# Patient Record
Sex: MALE | Race: WHITE | Employment: OTHER | URBAN - METROPOLITAN AREA
[De-identification: names, ages, dates, MRNs, and addresses within clinical notes are randomized per-mention and may not be internally consistent; named-entity substitution may affect disease eponyms.]

---

## 2024-06-27 ENCOUNTER — HOSPITAL ENCOUNTER (INPATIENT)
Age: 69
LOS: 9 days | Discharge: NURSING, REHAB OR LONG-TERM CARE | End: 2024-07-06
Attending: GENERAL PRACTICE
Payer: MEDICARE

## 2024-06-27 ENCOUNTER — APPOINTMENT (OUTPATIENT)
Dept: CT IMAGING | Age: 69
End: 2024-06-27
Payer: MEDICARE

## 2024-06-27 DIAGNOSIS — R33.8 ACUTE URINARY RETENTION: ICD-10-CM

## 2024-06-27 DIAGNOSIS — N17.9 ACUTE KIDNEY INJURY (HCC): Primary | ICD-10-CM

## 2024-06-27 DIAGNOSIS — K92.1 MELENA: ICD-10-CM

## 2024-06-27 DIAGNOSIS — R00.0 TACHYCARDIA: ICD-10-CM

## 2024-06-27 PROBLEM — R63.4 WEIGHT LOSS: Status: ACTIVE | Noted: 2024-06-27

## 2024-06-27 PROBLEM — D64.9 ANEMIA: Status: ACTIVE | Noted: 2024-06-27

## 2024-06-27 PROBLEM — R33.9 URINE RETENTION: Status: ACTIVE | Noted: 2024-06-27

## 2024-06-27 LAB
ALBUMIN SERPL-MCNC: 2.6 G/DL (ref 3.2–4.6)
ALBUMIN/GLOB SERPL: 0.6 (ref 1–1.9)
ALP SERPL-CCNC: 76 U/L (ref 40–129)
ALT SERPL-CCNC: 15 U/L (ref 12–65)
ANION GAP SERPL CALC-SCNC: 21 MMOL/L (ref 9–18)
APPEARANCE UR: CLEAR
AST SERPL-CCNC: 27 U/L (ref 15–37)
BACTERIA URNS QL MICRO: ABNORMAL /HPF
BASOPHILS # BLD: 0 K/UL (ref 0–0.2)
BASOPHILS NFR BLD: 0 % (ref 0–2)
BILIRUB SERPL-MCNC: 0.2 MG/DL (ref 0–1.2)
BILIRUB UR QL: NEGATIVE
BUN SERPL-MCNC: 157 MG/DL (ref 8–23)
CALCIUM SERPL-MCNC: 10 MG/DL (ref 8.8–10.2)
CASTS URNS QL MICRO: ABNORMAL /LPF
CHLORIDE SERPL-SCNC: 106 MMOL/L (ref 98–107)
CO2 SERPL-SCNC: 16 MMOL/L (ref 20–28)
COLOR UR: ABNORMAL
CREAT SERPL-MCNC: 6.52 MG/DL (ref 0.8–1.3)
DIFFERENTIAL METHOD BLD: ABNORMAL
EOSINOPHIL # BLD: 0 K/UL (ref 0–0.8)
EOSINOPHIL NFR BLD: 0 % (ref 0.5–7.8)
EPI CELLS #/AREA URNS HPF: ABNORMAL /HPF
ERYTHROCYTE [DISTWIDTH] IN BLOOD BY AUTOMATED COUNT: 13.8 % (ref 11.9–14.6)
FOLATE SERPL-MCNC: 28.6 NG/ML (ref 3.1–17.5)
GLOBULIN SER CALC-MCNC: 4.4 G/DL (ref 2.3–3.5)
GLUCOSE SERPL-MCNC: 95 MG/DL (ref 70–99)
GLUCOSE UR STRIP.AUTO-MCNC: NEGATIVE MG/DL
HCT VFR BLD AUTO: 30.6 % (ref 41.1–50.3)
HGB BLD-MCNC: 9.9 G/DL (ref 13.6–17.2)
HGB UR QL STRIP: NEGATIVE
IMM GRANULOCYTES # BLD AUTO: 0 K/UL (ref 0–0.5)
IMM GRANULOCYTES NFR BLD AUTO: 0 % (ref 0–5)
INR PPP: 1.2
IRON SATN MFR SERPL: 21 % (ref 20–50)
IRON SERPL-MCNC: 38 UG/DL (ref 35–100)
KETONES UR QL STRIP.AUTO: NEGATIVE MG/DL
LACTATE SERPL-SCNC: 0.9 MMOL/L (ref 0.5–2)
LEUKOCYTE ESTERASE UR QL STRIP.AUTO: ABNORMAL
LYMPHOCYTES # BLD: 0.4 K/UL (ref 0.5–4.6)
LYMPHOCYTES NFR BLD: 5 % (ref 13–44)
MCH RBC QN AUTO: 30.7 PG (ref 26.1–32.9)
MCHC RBC AUTO-ENTMCNC: 32.4 G/DL (ref 31.4–35)
MCV RBC AUTO: 95 FL (ref 82–102)
MONOCYTES # BLD: 0.4 K/UL (ref 0.1–1.3)
MONOCYTES NFR BLD: 5 % (ref 4–12)
NEUTS SEG # BLD: 8 K/UL (ref 1.7–8.2)
NEUTS SEG NFR BLD: 90 % (ref 43–78)
NITRITE UR QL STRIP.AUTO: NEGATIVE
NRBC # BLD: 0 K/UL (ref 0–0.2)
OTHER OBSERVATIONS: ABNORMAL
PH UR STRIP: 5.5 (ref 5–9)
PLATELET # BLD AUTO: 185 K/UL (ref 150–450)
PMV BLD AUTO: 10 FL (ref 9.4–12.3)
POTASSIUM SERPL-SCNC: 3.7 MMOL/L (ref 3.5–5.1)
PROT SERPL-MCNC: 7 G/DL (ref 6.3–8.2)
PROT UR STRIP-MCNC: NEGATIVE MG/DL
PROTHROMBIN TIME: 15.8 SEC (ref 11.3–14.9)
RBC # BLD AUTO: 3.22 M/UL (ref 4.23–5.6)
RBC #/AREA URNS HPF: ABNORMAL /HPF
SODIUM SERPL-SCNC: 143 MMOL/L (ref 136–145)
SP GR UR REFRACTOMETRY: 1.01 (ref 1–1.02)
TIBC SERPL-MCNC: 181 UG/DL (ref 240–450)
UIBC SERPL-MCNC: 143 UG/DL (ref 112–347)
UROBILINOGEN UR QL STRIP.AUTO: 0.2 EU/DL (ref 0.2–1)
VIT B12 SERPL-MCNC: >4000 PG/ML (ref 193–986)
WBC # BLD AUTO: 8.9 K/UL (ref 4.3–11.1)
WBC URNS QL MICRO: ABNORMAL /HPF

## 2024-06-27 PROCEDURE — 2580000003 HC RX 258: Performed by: INTERNAL MEDICINE

## 2024-06-27 PROCEDURE — 82607 VITAMIN B-12: CPT

## 2024-06-27 PROCEDURE — C9113 INJ PANTOPRAZOLE SODIUM, VIA: HCPCS | Performed by: INTERNAL MEDICINE

## 2024-06-27 PROCEDURE — 85025 COMPLETE CBC W/AUTO DIFF WBC: CPT

## 2024-06-27 PROCEDURE — 81001 URINALYSIS AUTO W/SCOPE: CPT

## 2024-06-27 PROCEDURE — 83550 IRON BINDING TEST: CPT

## 2024-06-27 PROCEDURE — 1100000000 HC RM PRIVATE

## 2024-06-27 PROCEDURE — 84153 ASSAY OF PSA TOTAL: CPT

## 2024-06-27 PROCEDURE — 83540 ASSAY OF IRON: CPT

## 2024-06-27 PROCEDURE — 51702 INSERT TEMP BLADDER CATH: CPT

## 2024-06-27 PROCEDURE — 2580000003 HC RX 258: Performed by: GENERAL PRACTICE

## 2024-06-27 PROCEDURE — 82746 ASSAY OF FOLIC ACID SERUM: CPT

## 2024-06-27 PROCEDURE — 99285 EMERGENCY DEPT VISIT HI MDM: CPT

## 2024-06-27 PROCEDURE — 6370000000 HC RX 637 (ALT 250 FOR IP): Performed by: INTERNAL MEDICINE

## 2024-06-27 PROCEDURE — 87040 BLOOD CULTURE FOR BACTERIA: CPT

## 2024-06-27 PROCEDURE — 83605 ASSAY OF LACTIC ACID: CPT

## 2024-06-27 PROCEDURE — 84154 ASSAY OF PSA FREE: CPT

## 2024-06-27 PROCEDURE — 6360000002 HC RX W HCPCS: Performed by: INTERNAL MEDICINE

## 2024-06-27 PROCEDURE — 80053 COMPREHEN METABOLIC PANEL: CPT

## 2024-06-27 PROCEDURE — 85610 PROTHROMBIN TIME: CPT

## 2024-06-27 RX ORDER — TAMSULOSIN HYDROCHLORIDE 0.4 MG/1
0.4 CAPSULE ORAL DAILY
Status: DISCONTINUED | OUTPATIENT
Start: 2024-06-28 | End: 2024-07-06 | Stop reason: HOSPADM

## 2024-06-27 RX ORDER — 0.9 % SODIUM CHLORIDE 0.9 %
1000 INTRAVENOUS SOLUTION INTRAVENOUS ONCE
Status: COMPLETED | OUTPATIENT
Start: 2024-06-27 | End: 2024-06-27

## 2024-06-27 RX ORDER — ONDANSETRON 2 MG/ML
4 INJECTION INTRAMUSCULAR; INTRAVENOUS EVERY 6 HOURS PRN
Status: DISCONTINUED | OUTPATIENT
Start: 2024-06-27 | End: 2024-07-06 | Stop reason: HOSPADM

## 2024-06-27 RX ORDER — SODIUM CHLORIDE 0.9 % (FLUSH) 0.9 %
5-40 SYRINGE (ML) INJECTION EVERY 12 HOURS SCHEDULED
Status: DISCONTINUED | OUTPATIENT
Start: 2024-06-27 | End: 2024-07-06 | Stop reason: HOSPADM

## 2024-06-27 RX ORDER — SODIUM CHLORIDE 9 MG/ML
INJECTION, SOLUTION INTRAVENOUS PRN
Status: DISCONTINUED | OUTPATIENT
Start: 2024-06-27 | End: 2024-07-06 | Stop reason: HOSPADM

## 2024-06-27 RX ORDER — SODIUM CHLORIDE 0.9 % (FLUSH) 0.9 %
5-40 SYRINGE (ML) INJECTION PRN
Status: DISCONTINUED | OUTPATIENT
Start: 2024-06-27 | End: 2024-07-06 | Stop reason: HOSPADM

## 2024-06-27 RX ORDER — HYDROCODONE BITARTRATE AND ACETAMINOPHEN 5; 325 MG/1; MG/1
1 TABLET ORAL EVERY 6 HOURS PRN
Status: DISCONTINUED | OUTPATIENT
Start: 2024-06-27 | End: 2024-06-28

## 2024-06-27 RX ORDER — POLYETHYLENE GLYCOL 3350 17 G/17G
17 POWDER, FOR SOLUTION ORAL DAILY PRN
Status: DISCONTINUED | OUTPATIENT
Start: 2024-06-27 | End: 2024-07-06 | Stop reason: HOSPADM

## 2024-06-27 RX ORDER — SODIUM CHLORIDE 9 MG/ML
INJECTION, SOLUTION INTRAVENOUS CONTINUOUS
Status: DISCONTINUED | OUTPATIENT
Start: 2024-06-27 | End: 2024-06-28

## 2024-06-27 RX ORDER — ONDANSETRON 4 MG/1
4 TABLET, ORALLY DISINTEGRATING ORAL EVERY 8 HOURS PRN
Status: DISCONTINUED | OUTPATIENT
Start: 2024-06-27 | End: 2024-07-06 | Stop reason: HOSPADM

## 2024-06-27 RX ORDER — ACETAMINOPHEN 325 MG/1
650 TABLET ORAL EVERY 6 HOURS PRN
Status: DISCONTINUED | OUTPATIENT
Start: 2024-06-27 | End: 2024-07-06 | Stop reason: HOSPADM

## 2024-06-27 RX ADMIN — SODIUM CHLORIDE 1000 ML: 9 INJECTION, SOLUTION INTRAVENOUS at 21:42

## 2024-06-27 RX ADMIN — PANTOPRAZOLE SODIUM 40 MG: 40 INJECTION, POWDER, FOR SOLUTION INTRAVENOUS at 23:48

## 2024-06-27 RX ADMIN — SODIUM CHLORIDE: 9 INJECTION, SOLUTION INTRAVENOUS at 23:49

## 2024-06-27 RX ADMIN — HYOSCYAMINE SULFATE 125 MCG: 0.12 TABLET ORAL; SUBLINGUAL at 23:56

## 2024-06-27 RX ADMIN — SODIUM CHLORIDE, PRESERVATIVE FREE 10 ML: 5 INJECTION INTRAVENOUS at 23:48

## 2024-06-27 ASSESSMENT — LIFESTYLE VARIABLES
HOW OFTEN DO YOU HAVE A DRINK CONTAINING ALCOHOL: NEVER
HOW MANY STANDARD DRINKS CONTAINING ALCOHOL DO YOU HAVE ON A TYPICAL DAY: PATIENT DOES NOT DRINK

## 2024-06-27 ASSESSMENT — PAIN SCALES - GENERAL
PAINLEVEL_OUTOF10: 0
PAINLEVEL_OUTOF10: 6

## 2024-06-27 ASSESSMENT — PAIN DESCRIPTION - LOCATION: LOCATION: RECTUM;ABDOMEN

## 2024-06-27 ASSESSMENT — PAIN DESCRIPTION - DESCRIPTORS: DESCRIPTORS: PRESSURE

## 2024-06-27 ASSESSMENT — PAIN - FUNCTIONAL ASSESSMENT: PAIN_FUNCTIONAL_ASSESSMENT: 0-10

## 2024-06-27 NOTE — ED TRIAGE NOTES
Pt arrives via EMS from home.  Pt lives alone and called ems due to rectal bleeding for 2 weeks and frequent falls last couple days.  Vitals stable.    Pt has hard distended abdomen and skin infection on legs.  Pt denies hx and states he has not been to a doctor in years.

## 2024-06-27 NOTE — ED PROVIDER NOTES
Emergency Department Provider Note       PCP: No primary care provider on file.   Age: 68 y.o.   Sex: male     DISPOSITION Admitted 06/27/2024 09:30:04 PM       ICD-10-CM    1. Acute kidney injury (HCC)  N17.9       2. Melena  K92.1       3. Acute urinary retention  R33.8           Medical Decision Making     Patient presenting with acute urinary retention.  4 L of urine drained with Colby catheter insertion.  Patient likely had postobstructive uropathy.  Will expect this to improve with IV fluids and now that the Colby is placed.  Patient also describing melena but not having any further episodes here.  I doubt acute upper GI bleed.  Nevertheless, this will need to be worked up further in the hospital.  There is nothing to suggest surgical abdomen such as bowel obstruction, perforated viscus, or any other emergent pathology.  Nothing to suggest sepsis at this time.  He will be admitted for further workup and management     1 or more acute illnesses that pose a threat to life or bodily function.   Drug therapy given requiring intensive monitoring for toxicity.  Discussion with external consultants.  Shared medical decision making was utilized in creating the patients health plan today.    I independently ordered and reviewed each unique test.           The patient was admitted and I have discussed patient management with the admitting provider.    Exclusion criteria - the patient is NOT to be included for SEP-1 Core Measure due to: Infection is not suspected       History     Patient presents with fall and weakness.  Patient has had rectal bleeding for 2 weeks.  Patient states the stool is dark.  He apparently has not seen a doctor in about 20 to 25 years.  He also admits to abdominal distention.  He is having some emesis.  Has had rapid weight loss.  He says he was so weak unable to stand and even had trouble eating and drinking.  Patient denies history of alcohol or drug use.  He has no other symptoms at this  for Visit: Reassess, Consult  Malnutrition Screening Tool: Malnutrition Screen  Have you recently lost weight without trying?: 14 to 23 pounds (2   points)  Have you been eating poorly because of a decreased appetite?: Yes   (1 point)  Malnutrition Screening Tool Score: 3  6/28- consult for poor po (hospitalist)    Nutrition Recommendations/Plan:   Meals and Snacks:  Diet: Continue current order  Nutrition Supplement Therapy:  Medical food supplement therapy:  Continue Ensure Enlive three   times per day (this provides 350 kcal and 20 grams protein per   bottle)     Malnutrition Assessment:  Malnutrition Assessment:  Malnutrition Status: Moderate malnutrition  Context: Chronic Illness  Findings of clinical characteristics of malnutrition:   Energy Intake:  No significant decrease in energy intake  Weight Loss:  Unable to assess     Body Fat Loss:  Mild body fat loss Buccal region, Triceps, Fat   Overlying Ribs   Muscle Mass Loss:  Mild muscle mass loss Hand (interosseous),   Clavicles (pectoralis & deltoids)  Fluid Accumulation:  Unable to assess     Strength:  Not Performed     Nutrition Assessment:  Nutrition History: Unable to assess as pt is off the floor.      Do You Have Any Cultural, Christianity, or Ethnic Food   Preferences?: No   Weight History: No wt hx per EMR. Current weight still with low   BMI.   Nutrition Background:       No PMH. Pt admitted with CRISTIN. Per H&P pt with wounds.   Nutrition Interval:  Patient in chair eating lunch. He states he tries to eat a bit   more every day. He reports he is drinking the ensures. Discussed   with RN, Carol who reports he is eating great but does   everything quite slowly.     Current Nutrition Therapies:  ADULT DIET; Easy to Chew  ADULT ORAL NUTRITION SUPPLEMENT; Breakfast, Lunch, Dinner;   Standard High Calorie/High Protein Oral Supplement    Current Intake:   Average Meal Intake: % Average Supplements Intake: 51-75%      Anthropometric

## 2024-06-28 ENCOUNTER — APPOINTMENT (OUTPATIENT)
Dept: ULTRASOUND IMAGING | Age: 69
End: 2024-06-28
Payer: MEDICARE

## 2024-06-28 ENCOUNTER — APPOINTMENT (OUTPATIENT)
Dept: CT IMAGING | Age: 69
End: 2024-06-28
Payer: MEDICARE

## 2024-06-28 PROBLEM — D64.9 NORMOCYTIC ANEMIA: Status: ACTIVE | Noted: 2024-06-28

## 2024-06-28 PROBLEM — R64 CACHEXIA (HCC): Status: ACTIVE | Noted: 2024-06-28

## 2024-06-28 PROBLEM — R31.9 HEMATURIA: Status: ACTIVE | Noted: 2024-06-28

## 2024-06-28 LAB
ALBUMIN SERPL-MCNC: 2.2 G/DL (ref 3.2–4.6)
ALBUMIN/GLOB SERPL: 0.5 (ref 1–1.9)
ALP SERPL-CCNC: 56 U/L (ref 40–129)
ALT SERPL-CCNC: 13 U/L (ref 12–65)
ANION GAP SERPL CALC-SCNC: 16 MMOL/L (ref 9–18)
AST SERPL-CCNC: 20 U/L (ref 15–37)
BASOPHILS # BLD: 0 K/UL (ref 0–0.2)
BASOPHILS NFR BLD: 1 % (ref 0–2)
BILIRUB SERPL-MCNC: <0.2 MG/DL (ref 0–1.2)
BUN SERPL-MCNC: 141 MG/DL (ref 8–23)
CALCIUM SERPL-MCNC: 8.9 MG/DL (ref 8.8–10.2)
CALCIUM SERPL-MCNC: 9.1 MG/DL (ref 8.8–10.2)
CHLORIDE SERPL-SCNC: 113 MMOL/L (ref 98–107)
CO2 SERPL-SCNC: 18 MMOL/L (ref 20–28)
CREAT SERPL-MCNC: 5.69 MG/DL (ref 0.8–1.3)
CREAT UR-MCNC: 51.3 MG/DL (ref 39–259)
DIFFERENTIAL METHOD BLD: ABNORMAL
EOSINOPHIL # BLD: 0.1 K/UL (ref 0–0.8)
EOSINOPHIL NFR BLD: 1 % (ref 0.5–7.8)
ERYTHROCYTE [DISTWIDTH] IN BLOOD BY AUTOMATED COUNT: 13.9 % (ref 11.9–14.6)
FERRITIN SERPL-MCNC: 528 NG/ML (ref 8–388)
GLOBULIN SER CALC-MCNC: 4.1 G/DL (ref 2.3–3.5)
GLUCOSE SERPL-MCNC: 87 MG/DL (ref 70–99)
HCT VFR BLD AUTO: 27.4 % (ref 41.1–50.3)
HCT VFR BLD AUTO: 29.1 % (ref 41.1–50.3)
HCT VFR BLD AUTO: 30.5 % (ref 41.1–50.3)
HGB BLD-MCNC: 9 G/DL (ref 13.6–17.2)
HGB BLD-MCNC: 9.5 G/DL (ref 13.6–17.2)
HGB BLD-MCNC: 9.5 G/DL (ref 13.6–17.2)
IMM GRANULOCYTES # BLD AUTO: 0 K/UL (ref 0–0.5)
IMM GRANULOCYTES NFR BLD AUTO: 0 % (ref 0–5)
LYMPHOCYTES # BLD: 0.5 K/UL (ref 0.5–4.6)
LYMPHOCYTES NFR BLD: 7 % (ref 13–44)
MAGNESIUM SERPL-MCNC: 2.1 MG/DL (ref 1.8–2.4)
MCH RBC QN AUTO: 30.8 PG (ref 26.1–32.9)
MCHC RBC AUTO-ENTMCNC: 32.6 G/DL (ref 31.4–35)
MCV RBC AUTO: 94.5 FL (ref 82–102)
MONOCYTES # BLD: 0.5 K/UL (ref 0.1–1.3)
MONOCYTES NFR BLD: 6 % (ref 4–12)
NEUTS SEG # BLD: 6.4 K/UL (ref 1.7–8.2)
NEUTS SEG NFR BLD: 85 % (ref 43–78)
NRBC # BLD: 0 K/UL (ref 0–0.2)
PHOSPHATE SERPL-MCNC: 5.9 MG/DL (ref 2.5–4.5)
PLATELET # BLD AUTO: 161 K/UL (ref 150–450)
PMV BLD AUTO: 9.6 FL (ref 9.4–12.3)
POTASSIUM SERPL-SCNC: 3.5 MMOL/L (ref 3.5–5.1)
PROT SERPL-MCNC: 6.3 G/DL (ref 6.3–8.2)
PSA FREE MFR SERPL: 23.8 %
PSA FREE SERPL-MCNC: 9.1 NG/ML
PSA SERPL-MCNC: 27.6 NG/ML (ref 0–4)
PSA SERPL-MCNC: 38.4 NG/ML (ref 0–4)
PTH-INTACT SERPL-MCNC: 15.7 PG/ML (ref 15–65)
RBC # BLD AUTO: 3.08 M/UL (ref 4.23–5.6)
SODIUM SERPL-SCNC: 148 MMOL/L (ref 136–145)
SODIUM UR-SCNC: 73 MMOL/L
WBC # BLD AUTO: 7.5 K/UL (ref 4.3–11.1)

## 2024-06-28 PROCEDURE — 97535 SELF CARE MNGMENT TRAINING: CPT

## 2024-06-28 PROCEDURE — 2580000003 HC RX 258: Performed by: FAMILY MEDICINE

## 2024-06-28 PROCEDURE — 97530 THERAPEUTIC ACTIVITIES: CPT

## 2024-06-28 PROCEDURE — 36415 COLL VENOUS BLD VENIPUNCTURE: CPT

## 2024-06-28 PROCEDURE — 71250 CT THORAX DX C-: CPT

## 2024-06-28 PROCEDURE — 84153 ASSAY OF PSA TOTAL: CPT

## 2024-06-28 PROCEDURE — 6360000004 HC RX CONTRAST MEDICATION: Performed by: INTERNAL MEDICINE

## 2024-06-28 PROCEDURE — C9113 INJ PANTOPRAZOLE SODIUM, VIA: HCPCS | Performed by: INTERNAL MEDICINE

## 2024-06-28 PROCEDURE — 6370000000 HC RX 637 (ALT 250 FOR IP): Performed by: FAMILY MEDICINE

## 2024-06-28 PROCEDURE — 85014 HEMATOCRIT: CPT

## 2024-06-28 PROCEDURE — 97162 PT EVAL MOD COMPLEX 30 MIN: CPT

## 2024-06-28 PROCEDURE — 83735 ASSAY OF MAGNESIUM: CPT

## 2024-06-28 PROCEDURE — 99222 1ST HOSP IP/OBS MODERATE 55: CPT | Performed by: NURSE PRACTITIONER

## 2024-06-28 PROCEDURE — 76770 US EXAM ABDO BACK WALL COMP: CPT

## 2024-06-28 PROCEDURE — 85018 HEMOGLOBIN: CPT

## 2024-06-28 PROCEDURE — 82570 ASSAY OF URINE CREATININE: CPT

## 2024-06-28 PROCEDURE — 80053 COMPREHEN METABOLIC PANEL: CPT

## 2024-06-28 PROCEDURE — 2580000003 HC RX 258: Performed by: INTERNAL MEDICINE

## 2024-06-28 PROCEDURE — 84100 ASSAY OF PHOSPHORUS: CPT

## 2024-06-28 PROCEDURE — 85025 COMPLETE CBC W/AUTO DIFF WBC: CPT

## 2024-06-28 PROCEDURE — 6370000000 HC RX 637 (ALT 250 FOR IP): Performed by: INTERNAL MEDICINE

## 2024-06-28 PROCEDURE — 84300 ASSAY OF URINE SODIUM: CPT

## 2024-06-28 PROCEDURE — 92610 EVALUATE SWALLOWING FUNCTION: CPT

## 2024-06-28 PROCEDURE — 97166 OT EVAL MOD COMPLEX 45 MIN: CPT

## 2024-06-28 PROCEDURE — 6360000002 HC RX W HCPCS: Performed by: INTERNAL MEDICINE

## 2024-06-28 PROCEDURE — 1100000000 HC RM PRIVATE

## 2024-06-28 PROCEDURE — 82728 ASSAY OF FERRITIN: CPT

## 2024-06-28 PROCEDURE — 83970 ASSAY OF PARATHORMONE: CPT

## 2024-06-28 RX ORDER — SODIUM CHLORIDE 450 MG/100ML
INJECTION, SOLUTION INTRAVENOUS CONTINUOUS
Status: DISCONTINUED | OUTPATIENT
Start: 2024-06-28 | End: 2024-07-01

## 2024-06-28 RX ADMIN — SODIUM CHLORIDE: 4.5 INJECTION, SOLUTION INTRAVENOUS at 21:09

## 2024-06-28 RX ADMIN — HYDROCODONE BITARTRATE AND ACETAMINOPHEN 15 ML: 7.5; 325 SOLUTION ORAL at 10:25

## 2024-06-28 RX ADMIN — PANTOPRAZOLE SODIUM 40 MG: 40 INJECTION, POWDER, FOR SOLUTION INTRAVENOUS at 11:28

## 2024-06-28 RX ADMIN — SODIUM CHLORIDE, PRESERVATIVE FREE 10 ML: 5 INJECTION INTRAVENOUS at 10:34

## 2024-06-28 RX ADMIN — SODIUM CHLORIDE: 4.5 INJECTION, SOLUTION INTRAVENOUS at 10:28

## 2024-06-28 RX ADMIN — PANTOPRAZOLE SODIUM 40 MG: 40 INJECTION, POWDER, FOR SOLUTION INTRAVENOUS at 21:10

## 2024-06-28 RX ADMIN — SODIUM CHLORIDE, PRESERVATIVE FREE 10 ML: 5 INJECTION INTRAVENOUS at 21:14

## 2024-06-28 RX ADMIN — DIATRIZOATE MEGLUMINE AND DIATRIZOATE SODIUM 15 ML: 660; 100 LIQUID ORAL; RECTAL at 11:28

## 2024-06-28 RX ADMIN — TAMSULOSIN HYDROCHLORIDE 0.4 MG: 0.4 CAPSULE ORAL at 11:28

## 2024-06-28 ASSESSMENT — PAIN DESCRIPTION - ORIENTATION
ORIENTATION: POSTERIOR
ORIENTATION: LOWER

## 2024-06-28 ASSESSMENT — PAIN DESCRIPTION - PAIN TYPE: TYPE: ACUTE PAIN

## 2024-06-28 ASSESSMENT — PAIN DESCRIPTION - LOCATION
LOCATION: BACK
LOCATION: BACK

## 2024-06-28 ASSESSMENT — PAIN SCALES - GENERAL
PAINLEVEL_OUTOF10: 6
PAINLEVEL_OUTOF10: 6
PAINLEVEL_OUTOF10: 3

## 2024-06-28 ASSESSMENT — PAIN DESCRIPTION - FREQUENCY: FREQUENCY: INTERMITTENT

## 2024-06-28 ASSESSMENT — PAIN DESCRIPTION - ONSET: ONSET: GRADUAL

## 2024-06-28 NOTE — PROGRESS NOTES
END OF SHIFT NOTE:    INTAKE/OUTPUT  06/27 0701 - 06/28 0700  In: -   Out: 5750 [Urine:5750]  Voiding: No  Catheter: Yes  Drain:   Urinary Catheter 06/27/24 Coude;2 Way (Active)   $ Urethral catheter insertion $ Not inserted for procedure 06/27/24 2011   Catheter Indications Urinary retention (acute or chronic), continuous bladder irrigation or bladder outlet obstruction 06/28/24 1025   Site Assessment Red 06/28/24 1025   Urine Color Bloody 06/28/24 1025   Urine Appearance Other (Comment) 06/28/24 0539   Collection Container Standard 06/28/24 1025   Securement Method Securing device (Describe) 06/28/24 1025   Catheter Care  Perineal wipes 06/27/24 2330   Catheter Best Practices  Drainage tube clipped to bed;Catheter secured to thigh;Tamper seal intact;Bag below bladder;Bag not on floor;Lack of dependent loop in tubing;Drainage bag less than half full 06/28/24 1025   Status Draining;Patent 06/28/24 1025   Output (mL) 300 mL 06/28/24 1738               Flatus: Patient does have flatus present.    Stool:  occurrences.    Characteristics:                Emesis:  occurrences.    Characteristics:        VITAL SIGNS  Patient Vitals for the past 12 hrs:   Temp Pulse Resp BP SpO2   06/28/24 1124 97.5 °F (36.4 °C) 90 17 (!) 107/59 98 %   06/28/24 0732 97.7 °F (36.5 °C) 85 17 110/69 99 %       Pain Assessment  Pain Level: 3 (06/28/24 1115)  Pain Location: Back  Patient's Stated Pain Goal: 3    Ambulating  Yes    Shift report given to oncoming nurse at the bedside.    Gaby Rankin RN

## 2024-06-28 NOTE — ACP (ADVANCE CARE PLANNING)
Advance Care Planning   Healthcare Decision Maker:    Primary Decision Maker: Evangelina Cox [facility] [phone]    Click here to complete Healthcare Decision Makers including selection of the Healthcare Decision Maker Relationship (ie \"Primary\").

## 2024-06-28 NOTE — PROGRESS NOTES
ACUTE PHYSICAL THERAPY GOALS:   (Developed with and agreed upon by patient and/or caregiver.)  LTG:  (1.)Mr. Cox  will move from supine to sit and sit to supine in flat bed without siderails with INDEPENDENT  within 7 day(s).    (2.)Mr. Cox  will transfer from bed to chair and chair to bed with  MODIFIED INDEPENDENCE  using the least restrictive/no device within 7 day(s).    (3.)Mr. Cox  will ambulate with  MODIFIED INDEPENDENCE  for 100+ feet with the least restrictive/no device within 7 day(s).   (4.)Mr. Cox  will ambulate up/down 1 steps with bilateral railing with  MODIFIED INDEPENDENCE with no device within  7  day(s).      PHYSICAL THERAPY Initial Assessment and Daily Note  (Link to Caseload Tracking: PT Visit Days : 1  Acknowledge Orders  Time In/Out  PT Charge Capture  Rehab Caseload Tracker    Carlos Cox is a 68 y.o. male   PRIMARY DIAGNOSIS: CRISTIN (acute kidney injury) ([facility])  Melena [K92.1]  CRISTIN (acute kidney injury) (HCC) [N17.9]  Acute kidney injury (HCC) [N17.9]  Acute urinary retention [R33.8]       Reason for Referral: Other abnormalities of gait and mobility (R26.89)  Inpatient: Payor: /     ASSESSMENT:     REHAB RECOMMENDATIONS:   Recommendation to date pending progress:  Setting:  Short-term Rehab    Equipment:    Rolling Walker     ASSESSMENT:  Mr. Cox lives alone in an apartment and does not have any kind of support system, reports having had great difficulty with caring for himself and meeting basic needs. .  He ambulates independently in home and community. He was admitted with urine retention.  Patient is cachectic appearing with copious amounts of dry flaking necrotic appearing skin on distal legs/feet.  Patient is generally weak.  He was given min to mod A for all mobility today.  All movement extremely slow and effortful.  Patient has declined in functional mobility recently.  Mr. Cox would benefit from skilled physical therapy while in acute care (medically necessary) to

## 2024-06-28 NOTE — PROGRESS NOTES
Sodium 148 (H) 136 - 145 mmol/L    Potassium 3.5 3.5 - 5.1 mmol/L    Chloride 113 (H) 98 - 107 mmol/L    CO2 18 (L) 20 - 28 mmol/L    Anion Gap 16 9 - 18 mmol/L    Glucose 87 70 - 99 mg/dL     (H) 8 - 23 MG/DL    Creatinine 5.69 (H) 0.80 - 1.30 MG/DL    Est, Glom Filt Rate 10 (L) >60 ml/min/1.73m2    Calcium 9.1 8.8 - 10.2 MG/DL    Total Bilirubin <0.2 0.0 - 1.2 MG/DL    ALT 13 12 - 65 U/L    AST 20 15 - 37 U/L    Alk Phosphatase 56 40 - 129 U/L    Total Protein 6.3 6.3 - 8.2 g/dL    Albumin 2.2 (L) 3.2 - 4.6 g/dL    Globulin 4.1 (H) 2.3 - 3.5 g/dL    Albumin/Globulin Ratio 0.5 (L) 1.0 - 1.9     CBC with Auto Differential    Collection Time: 06/28/24  4:58 AM   Result Value Ref Range    WBC 7.5 4.3 - 11.1 K/uL    RBC 3.08 (L) 4.23 - 5.6 M/uL    Hemoglobin 9.5 (L) 13.6 - 17.2 g/dL    Hematocrit 29.1 (L) 41.1 - 50.3 %    MCV 94.5 82 - 102 FL    MCH 30.8 26.1 - 32.9 PG    MCHC 32.6 31.4 - 35.0 g/dL    RDW 13.9 11.9 - 14.6 %    Platelets 161 150 - 450 K/uL    MPV 9.6 9.4 - 12.3 FL    nRBC 0.00 0.0 - 0.2 K/uL    Differential Type AUTOMATED      Neutrophils % 85 (H) 43 - 78 %    Lymphocytes % 7 (L) 13 - 44 %    Monocytes % 6 4.0 - 12.0 %    Eosinophils % 1 0.5 - 7.8 %    Basophils % 1 0.0 - 2.0 %    Immature Granulocytes % 0 0.0 - 5.0 %    Neutrophils Absolute 6.4 1.7 - 8.2 K/UL    Lymphocytes Absolute 0.5 0.5 - 4.6 K/UL    Monocytes Absolute 0.5 0.1 - 1.3 K/UL    Eosinophils Absolute 0.1 0.0 - 0.8 K/UL    Basophils Absolute 0.0 0.0 - 0.2 K/UL    Immature Granulocytes Absolute 0.0 0.0 - 0.5 K/UL   Ferritin    Collection Time: 06/28/24  4:58 AM   Result Value Ref Range    Ferritin 528 (H) 8 - 388 NG/ML   Magnesium    Collection Time: 06/28/24  4:58 AM   Result Value Ref Range    Magnesium 2.1 1.8 - 2.4 mg/dL   PSA, Diagnostic    Collection Time: 06/28/24  4:58 AM   Result Value Ref Range    PSA 27.6 (H) 0.0 - 4.0 ng/mL   Hemoglobin and Hematocrit    Collection Time: 06/28/24 10:07 AM   Result Value Ref Range     Hemoglobin 9.5 (L) 13.6 - 17.2 g/dL    Hematocrit 30.5 (L) 41.1 - 50.3 %   PTH, Intact    Collection Time: 06/28/24 10:07 AM   Result Value Ref Range    Calcium 8.9 8.8 - 10.2 MG/DL    Pth Intact 15.7 15.0 - 65.0 pg/mL   Phosphorus    Collection Time: 06/28/24 10:07 AM   Result Value Ref Range    Phosphorus 5.9 (H) 2.5 - 4.5 MG/DL   Sodium, urine, random    Collection Time: 06/28/24 10:32 AM   Result Value Ref Range    SODIUM, RANDOM URINE 73 MMOL/L   Creatinine, Random Urine    Collection Time: 06/28/24 10:32 AM   Result Value Ref Range    Creatinine, Ur 51.30 39.00 - 259.00 mg/dL       No results for input(s): \"COVID19\" in the last 72 hours.    Current Meds:  Current Facility-Administered Medications   Medication Dose Route Frequency    HYDROcodone-acetaminophen 2.5-108 mg/5 mL solution 15 mL  15 mL Oral Q4H PRN    0.45 % sodium chloride infusion   IntraVENous Continuous    diatrizoate meglumine-sodium (GASTROGRAFIN) 66-10 % solution 15 mL  15 mL Oral ONCE PRN    iopamidol (ISOVUE-370) 76 % injection 100 mL  100 mL IntraVENous ONCE PRN    sodium chloride flush 0.9 % injection 5-40 mL  5-40 mL IntraVENous 2 times per day    sodium chloride flush 0.9 % injection 5-40 mL  5-40 mL IntraVENous PRN    0.9 % sodium chloride infusion   IntraVENous PRN    ondansetron (ZOFRAN-ODT) disintegrating tablet 4 mg  4 mg Oral Q8H PRN    Or    ondansetron (ZOFRAN) injection 4 mg  4 mg IntraVENous Q6H PRN    polyethylene glycol (GLYCOLAX) packet 17 g  17 g Oral Daily PRN    acetaminophen (TYLENOL) tablet 650 mg  650 mg Oral Q6H PRN    Or    acetaminophen (TYLENOL) suppository 650 mg  650 mg Rectal Q6H PRN    pantoprazole (PROTONIX) 40 mg in sodium chloride (PF) 0.9 % 10 mL injection  40 mg IntraVENous Q12H    hyoscyamine (LEVSIN/SL) sublingual tablet 125 mcg  125 mcg SubLINGual Q4H PRN    tamsulosin (FLOMAX) capsule 0.4 mg  0.4 mg Oral Daily       Signed:  Abiodun Parmar,     Part of this note may have been written by using a voice

## 2024-06-28 NOTE — PROGRESS NOTES
Comprehensive Nutrition Assessment    Type and Reason for Visit: Initial, Positive Nutrition Screen  Malnutrition Screening Tool: Malnutrition Screen  Have you recently lost weight without trying?: 14 to 23 pounds (2 points)  Have you been eating poorly because of a decreased appetite?: Yes (1 point)  Malnutrition Screening Tool Score: 3    Nutrition Recommendations/Plan:   Meals and Snacks:  Diet: Continue current order  Nutrition Supplement Therapy:  Medical food supplement therapy:  Initiate Ensure Enlive three times per day (this provides 350 kcal and 20 grams protein per bottle)     Malnutrition Assessment:  Malnutrition Status: Insufficient data       Nutrition Assessment:  Nutrition History: Unable to assess as pt is off the floor.      Do You Have Any Cultural, Congregational, or Ethnic Food Preferences?: No   Weight History: No wt hx per EMR. Under wt per BMI based off of stated wt.   Nutrition Background:       No PMH. Pt admitted with CRISTIN. Per H&P pt with wounds.   Nutrition Interval:  RD attempted to see pt multiple times however each time he was off the floor for a test. Discussed with Gaby SMITH. Per nursing flow sheet documentation pt ate % of lunch. Pt was NPO at breakfast this am pending SLP eval. SLP recommends easy to chew diet. Will add Ensure due to low body wt.     Current Nutrition Therapies:  ADULT DIET; Easy to Chew    Current Intake:   Average Meal Intake: %        Anthropometric Measures:  Height: 168.9 cm (5' 6.5\")  Current Body Wt: 40.8 kg (90 lb) (6/27), Weight source: Stated  BMI: 14.3, Underweight (BMI less than 22) age over 65  Admission Body Weight: 40.8 kg (90 lb) (6/27- stated)  Ideal Body Weight (Kg) (Calculated): 66 kg (145 lbs),    BMI Category Underweight (BMI less than 22) age over 65  Estimated Daily Nutrient Needs:  Energy (kcal/day): 5672-9794 (35-40 kcal/kg) (Kcal/kg Weight used: 40.8 kg Current  Protein (g/day): 49-61 (1.2-1.5 g/kg) Weight Used: (Current) 40.8

## 2024-06-28 NOTE — CARE COORDINATION
RNCM met with patient in room 216 to discuss discharge planning.    Patient lives alone in a 1st floor apartment with level entry. Patient completes ADLs independently at baseline, but endorses increased weakness recently. Patient has no current DME or home care services. Patient did provide health insurance information to RNCM (Medicare Part A only coverage). Copy of card made and taken to business office. Patient does not have PCP, refuses referral at this time. Demographics verified. CM following for discharge needs.       06/28/24 8803   Service Assessment   Patient Orientation Alert and Oriented   Cognition Alert   History Provided By Patient   Primary Caregiver Self   Accompanied By/Relationship n/a   Support Systems Friends/Neighbors   Patient's Healthcare Decision Maker is: Legal Next of Kin   PCP Verified by CM No  (Refuses referral)   Prior Functional Level Independent in ADLs/IADLs   Current Functional Level Assistance with the following:;Bathing;Dressing;Mobility   Can patient return to prior living arrangement Unknown at present   Ability to make needs known: Good   Family able to assist with home care needs: No   Would you like for me to discuss the discharge plan with any other family members/significant others, and if so, who? No   Financial Resources Medicare  (Part A only)   Community Resources None   Social/Functional History   Lives With Alone   Type of Home Apartment   Home Layout One level   Home Access Level entry   Home Equipment None   Receives Help From Friend(s)   ADL Assistance Independent   Ambulation Assistance Independent   Transfer Assistance Independent   Active  Yes   Occupation Retired   Discharge Planning   Type of Residence Apartment   Living Arrangements Alone   Current Services Prior To Admission None   DME Ordered? No   Potential Assistance Purchasing Medications No   Type of Home Care Services None   Patient expects to be discharged to: Apartment   History of falls? 1

## 2024-06-28 NOTE — PROGRESS NOTES
SPEECH LANGUAGE PATHOLOGY: DYSPHAGIA Initial Assessment and Discharge    Acknowledge Order  I  Therapy Time  I   Charges     I  Rehab Caseload Tracker      NAME: Carlos Cox  : 1955  MRN: [id number]    ADMISSION DATE: 2024  PRIMARY DIAGNOSIS: CRISTIN (acute kidney injury) (HCC)    ICD-10: Treatment Diagnosis: R13.11 Dysphagia, Oral Phase    RECOMMENDATIONS   Diet:    Easy to Chew  Thin Liquids    Medication: as tolerated   Compensatory Swallowing Strategies:   Alternate solids and liquids  Small bites/sips  Upright as possible for all oral intake   Therapeutic Intervention:   Patient/family education   Patient continues to require skilled intervention:  No. Please re-consult if new concerns arise.      Anticipated Discharge Needs: No additional speech therapy is indicated.      ASSESSMENT    Patient presents with functional oropharyngeal swallow. Unusual prep including swishing pudding. Patient reports intentionally performing this action as he wants to \"be careful that I don't choke\".  Functional mastication with solids textures and appropriate oral clearance. Occasional swishing also observed with thin liquids, but patient again stating this was an intentional action. Adequate oral clearance. No s/sx of airway compromise, and patient denying any dysphagia symptoms. He does indicate preference for softer foods for \"safety\".     Recommend easy to chew diet and thin liquids. Medications as tolerated. No additional speech therapy indicated.     GENERAL    Subjective: Patient alert and oriented. Required frequent redirection during evaluation.     Recent Information/Background: Patient admitted for abdominal pain and decreased urination. Endorses \"choking\" and complains that something is stuck in his mouth.     History of Present Injury/Illness: Mr. Cox  has no past medical history on file.. He also  has no past surgical history on file.  Precautions/Allergies: Patient has no known allergies.  restricted.   [] 2 Moderate-Severe Dysphagia: Maximum assistance or maximum use     of strategies with partial po intake   [] 1 Severe dysphagia- NPO. Unable to tolerate any po safely     PLAN    Duration/Frequency: No treatment indicated at this time    Rehabilitation Potential For Stated Goals: Good    Interdisciplinary Collaboration: MD/ PA/ NP  and RN/ PCT    Medical Necessity    No additional speech therapy indicated at this time.     Education:   Patient educated on Results of evaluation, Speech therapy recommendations, Role of speech therapy, SLP plan, and Diet recommendations  Education provided to Patient  Education response: Verbalizes understanding    Safety:   Call light within reach  In Bed  RN notified   MD notified    Therapy Time:  Time In: 0847  Time Out: 0915  Minutes: 28    ROSA MARIA Frausto  6/28/2024 10:30 AM

## 2024-06-28 NOTE — ED NOTES
Attempted I&O cath for urine sample, resistance was met. Nancy BYRD attempted again with coude catheter. Greater than 1000ml returned immediately. Decision was made to leave coude catheter in, attached to nowak bag. Dr Barnes notified, order received for indwelling catheter. 3800ml returned within 20min of insertion. Pt abdominal distension resolved.      Phoebe Martin, RN  06/27/24 4710

## 2024-06-28 NOTE — CONSULTS
Nephrology consult    Admission Date:  6/27/2024    Admission Diagnosis  Melena [K92.1]  CRISTIN (acute kidney injury) (HCC) [N17.9]  Acute kidney injury (HCC) [N17.9]  Acute urinary retention [R33.8]    History of Present Illness:    68-year-old male with no known past medical history is admitted for fall and rectal bleeding.  He complains of abdominal pain and decreased urination for 2 weeks.  He was found with a urinary retention Colby catheter was placed with 4 L urine output.  On admission his creatinine was 6.5 and BUN was 157.    Review of the chart does not show any baseline creatinine.  He is admitted with a creatinine of 6.5 and  has improved to 5.6 with Colby catheter    Patient seen and examined in room.  Very emaciated.  He claims that he has not been eating and drinking well for the past 3 weeks.    He denies any use of NSAIDs.  He has a remote history of kidney stones    No past medical history on file.   No past surgical history on file.   Current Facility-Administered Medications   Medication Dose Route Frequency    HYDROcodone-acetaminophen 2.5-108 mg/5 mL solution 15 mL  15 mL Oral Q4H PRN    0.45 % sodium chloride infusion   IntraVENous Continuous    iopamidol (ISOVUE-370) 76 % injection 100 mL  100 mL IntraVENous ONCE PRN    sodium chloride flush 0.9 % injection 5-40 mL  5-40 mL IntraVENous 2 times per day    sodium chloride flush 0.9 % injection 5-40 mL  5-40 mL IntraVENous PRN    0.9 % sodium chloride infusion   IntraVENous PRN    ondansetron (ZOFRAN-ODT) disintegrating tablet 4 mg  4 mg Oral Q8H PRN    Or    ondansetron (ZOFRAN) injection 4 mg  4 mg IntraVENous Q6H PRN    polyethylene glycol (GLYCOLAX) packet 17 g  17 g Oral Daily PRN    acetaminophen (TYLENOL) tablet 650 mg  650 mg Oral Q6H PRN    Or    acetaminophen (TYLENOL) suppository 650 mg  650 mg Rectal Q6H PRN    pantoprazole (PROTONIX) 40 mg in sodium chloride (PF) 0.9 % 10 mL injection  40 mg IntraVENous Q12H    hyoscyamine

## 2024-06-28 NOTE — H&P
Hospitalist History and Physical   Admit Date:  2024  6:52 PM   Name:  Carlos Cox   Age:  68 y.o.  Sex:  male  :  1955   MRN:  [id number]   Room:  ER/    Presenting/Chief Complaint: Fall and Rectal Bleeding     Reason(s) for Admission: CRISTIN (acute kidney injury) (HCC) [N17.9]     History of Present Illness:     Carlos Cox is a 68 y.o. male with medical history of  none, living alone, who is evaluated with abdominal pain/ decreased urination for 2 weeks. He is able to urinate but seems much less. He has no local family or friends/ his mother lives in [place] and would be his next of kin and first emergency contact. Evangelina [phone].    Found with urine retention 4 L in bladder s/p nowak  Creatinine 6.52/   Receiving IVF   Bicarb 16 , anion gap 21  Not aware of any prostate issues  Has pain after nowak placed       Has been eating but less  Can drive   Tried to get to the store and stocks up on food but is hard  Has lost weight    Disheveled and has skin changes to LE  Can't walk  Chokes with eating and feels as if something is stuck in mouth and wants to brush his teeth    Had some dark stools, taking peptobismol  Denies NSAID use   HGB 9.9  Albumin 2.6           FULL CODE  Mother Evangelina is next of kin and he agrees for her to make decisions for him in the event he is not able         Assessment & Plan:     Principal Problem:    CRISTIN (acute kidney injury) (HCC)  Plan:   Admit remote tele   NS 100cc/hr  Trend BMP  CT chest/ abd/ pelvis with oral contrast   Nephrology consult  Urology consult   Flomax   Levsin for bladder spasms   Nowak   Check PSA            Active Problems:    Weight loss  Plan:   CT chest/ Abd/ pelvis   Start with speech workup  May need GI for EGD            Anemia  Plan:   HGB 9.9  Check iron panel, b12, folate   IV protonix every 12 hours   May need GI consult   Trend HGB every 12 hours           Urine retention  Plan:   Nowak  Flomax  Urology consult

## 2024-06-28 NOTE — PROGRESS NOTES
4 Eyes Skin Assessment     NAME:  Carlos Cox  YOB: 1955  MEDICAL RECORD NUMBER:  [id number]    The patient is being assessed for  Admission    I agree that at least one RN has performed a thorough Head to Toe Skin Assessment on the patient. ALL assessment sites listed below have been assessed.      Areas assessed by both nurses:    Head, Face, Ears, Shoulders, Back, Chest, Arms, Elbows, Hands, Sacrum. Buttock, Coccyx, Ischium, Legs. Feet and Heels, and Under Medical Devices         Does the Patient have a Wound? Yes wound(s) were present on assessment. LDA wound assessment was Initiated and completed by RN       Jose Prevention initiated by RN: Yes  Wound Care Orders initiated by RN: Yes    Pressure Injury (Stage 3,4, Unstageable, DTI, NWPT, and Complex wounds) if present, place Wound referral order by RN under : Yes    New Ostomies, if present place, Ostomy referral order under : No     Nurse 1 eSignature: Electronically signed by Phoebe Pratt RN on 6/28/24 at 12:42 AM EDT    **SHARE this note so that the co-signing nurse can place an eSignature**    Nurse 2 eSignature: Electronically signed by Nora Felix RN on 6/28/24 at 1:42 AM EDT

## 2024-06-28 NOTE — WOUND CARE
Patient off unit for testing, unable to assess wounds.  Nurse reports open shallow area on sacrum with purple discoloration recommend foam dressing every other day.  Bilateral lower legs with peeling skin no open areas per nurse, no weeping. Recommend to leave open to air. Will plan to assess on Monday.

## 2024-06-28 NOTE — ED NOTES
TRANSFER - OUT REPORT:    Verbal report given to Phoebe SMITH on Carlos Cox  being transferred to [facility] for routine progression of patient care       Report consisted of patient's Situation, Background, Assessment and   Recommendations(SBAR).     Information from the following report(s) Nurse Handoff Report was reviewed with the receiving nurse.    Lines:   Peripheral IV 06/27/24 Left;Dorsal Forearm (Active)       Peripheral IV Left Antecubital (Active)        Opportunity for questions and clarification was provided.      Patient transported with:  Phoebe Gordon RN  06/27/24 2539

## 2024-06-28 NOTE — PROGRESS NOTES
[] [] [] []    Supine to Sit [] [] [] [] [] [] [x] [] [] [] []    Scooting [] [] [] [] [] [x] [] [] [] [] []    Sit to Supine [] [] [] [] [] [] [] [] [] [] []    Transfers    Sit to Stand [] [] [] [] [] [x] [] [] [] [] [x]    Bed to Chair [] [] [] [] [] [x] [] [] [] [] [x]    Stand to Sit [] [] [] [] [] [x] [] [] [] [] [x]    Tub/Shower [] [] [] [] [] [] [] [] [] [] []     Toilet [] [] [] [] [] [] [] [] [] [] []      [] [] [] [] [] [] [] [] [] [] []    I=Independent, Mod I=Modified Independent, S=Supervision/Setup, SBA=Standby Assistance, CGA=Contact Guard Assistance, Min=Minimal Assistance, Mod=Moderate Assistance, Max=Maximal Assistance, Total=Total Assistance, NT=Not Tested    ACTIVITIES OF DAILY LIVING: I Mod I S SBA CGA Min Mod Max Total NT Comments   BASIC ADLs:              Upper Body Bathing  [] [] [] [] [] [] [] [] [] []     Lower Body Bathing [] [] [] [] [] [] [] [x] [] []     Toileting [] [] [] [] [] [] [] [] [] []    Upper Body Dressing [] [] [] [] [] [] [] [] [] []    Lower Body Dressing [] [] [] [] [] [] [] [x] [] []    Feeding [] [] [] [] [] [] [] [] [] []    Grooming [] [] [] [] [] [x] [] [] [] []    Personal Device Care [] [] [] [] [] [] [] [] [] []    Functional Mobility [] [] [] [] [] [x] [] [] [] [] x2   I=Independent, Mod I=Modified Independent, S=Supervision/Setup, SBA=Standby Assistance, CGA=Contact Guard Assistance, Min=Minimal Assistance, Mod=Moderate Assistance, Max=Maximal Assistance, Total=Total Assistance, NT=Not Tested    PLAN:   FREQUENCY/DURATION   OT Plan of Care: 3 times/week for duration of hospital stay or until stated goals are met, whichever comes first.    PROBLEM LIST:   (Skilled intervention is medically necessary to address:)  Decreased ADL/Functional Activities  Decreased Activity Tolerance  Decreased AROM/PROM  Decreased Balance  Decreased Strength  Decreased Transfer Abilities  Increased Pain   INTERVENTIONS PLANNED:  (Benefits and precautions of occupational therapy have  been discussed with the patient.)  Self Care Training  Therapeutic Activity  Therapeutic Exercise/HEP  Neuromuscular Re-education  Manual Therapy  Education         TREATMENT:     EVALUATION: MODERATE COMPLEXITY: (Untimed Charge)  The initial evaluation charge encompasses clinical chart review, objective assessment, interpretation of assessment, and skilled monitoring of the patient's response to treatment in order to develop a plan of care.     TREATMENT:   Co-Treatment PT/OT necessary due to patient's decreased overall endurance/tolerance levels, as well as need for high level skilled assistance to complete functional transfers/mobility and functional tasks  Self Care (23 minutes): Patient participated in lower body bathing, lower body dressing, and grooming ADLs in unsupported sitting with minimal verbal cueing to increase independence, decrease assistance required, and increase activity tolerance. Patient also participated in bed mobility and functional mobility training to increase independence, decrease assistance required, and increase activity tolerance.     TREATMENT GRID:  N/A    AFTER TREATMENT PRECAUTIONS: Alarm Activated, Call light within reach, Chair, Needs within reach, and RN notified    INTERDISCIPLINARY COLLABORATION:  RN/ PCT and PT/ PTA    EDUCATION:  Education Given To: Patient  Education Provided: Role of Therapy;Plan of Care;ADL Adaptive Strategies;Transfer Training    TOTAL TREATMENT DURATION AND TIME:  Time In: 1054  Time Out: 1126  Minutes: 32    Hortensia Marie OT

## 2024-06-28 NOTE — CONSULTS
Urology Consult    Requesting MD:     Patient: Carlos Cox MRN: [id number]  SSN: [id number]    YOB: 1955  Age: 68 y.o.  Sex: male      Subjective:      Carlos Cox is a 68 y.o. male who with medical history of  none, living alone, who is evaluated with abdominal pain/ decreased urination for 2 weeks. He is able to urinate but seems much less. He has no local family or friends/ his mother lives in [place] and would be his next of kin and first emergency contact. [place] [phone].     Found with urine retention 4 L in bladder s/p nowak  Creatinine 6.52/   Receiving IVF   Bicarb 16 , anion gap 21  Not aware of any prostate issues  Has pain after nowak placed         Has been eating but less  Can drive   Tried to get to the store and stocks up on food but is hard  Has lost weight     Disheveled and has skin changes to LE  Can't walk  Chokes with eating and feels as if something is stuck in mouth and wants to brush his teeth     Had some dark stools, taking peptobismol  Denies NSAID use   HGB 9.9  Albumin 2.6.     Urology consult for urinary retention >4L.    Patient seen at bedside. A&O appears emaciated. Reports difficulty emptying his bladder past 2 weeks. Reports voiding, but incomplete emptying. Nowak placed in ED. Denies prostate issues or LUTS until 2 weeks ago. CT AP w/ contrast ordered.    No past medical history on file.  No past surgical history on file.   No family history on file.  Social History     Tobacco Use    Smoking status: Never     Passive exposure: Never    Smokeless tobacco: Never   Substance Use Topics    Alcohol use: Not on file      Prior to Admission medications    Not on File        No Known Allergies    Review of Systems:  A comprehensive review of systems was negative except for that written in the History of Present Illness.    Objective:     Vitals:    06/27/24 1855 06/27/24 2306 06/28/24 0255 06/28/24 0732   BP: 136/79 (!) 146/79 102/66 110/69   Pulse: 92 97 95

## 2024-06-29 PROBLEM — E44.0 MODERATE PROTEIN-CALORIE MALNUTRITION (HCC): Status: ACTIVE | Noted: 2024-06-29

## 2024-06-29 PROBLEM — B35.3 TINEA PEDIS: Status: ACTIVE | Noted: 2024-06-29

## 2024-06-29 LAB
ALBUMIN SERPL-MCNC: 2.2 G/DL (ref 3.2–4.6)
ALBUMIN/GLOB SERPL: 0.5 (ref 1–1.9)
ALP SERPL-CCNC: 52 U/L (ref 40–129)
ALT SERPL-CCNC: 12 U/L (ref 12–65)
ANION GAP SERPL CALC-SCNC: 12 MMOL/L (ref 9–18)
AST SERPL-CCNC: 26 U/L (ref 15–37)
BILIRUB DIRECT SERPL-MCNC: <0.2 MG/DL (ref 0–0.4)
BILIRUB SERPL-MCNC: <0.2 MG/DL (ref 0–1.2)
BUN SERPL-MCNC: 102 MG/DL (ref 8–23)
CALCIUM SERPL-MCNC: 8.2 MG/DL (ref 8.8–10.2)
CHLORIDE SERPL-SCNC: 107 MMOL/L (ref 98–107)
CO2 SERPL-SCNC: 18 MMOL/L (ref 20–28)
CREAT SERPL-MCNC: 3.39 MG/DL (ref 0.8–1.3)
EKG ATRIAL RATE: 80 BPM
EKG DIAGNOSIS: NORMAL
EKG P AXIS: 72 DEGREES
EKG P-R INTERVAL: 144 MS
EKG Q-T INTERVAL: 386 MS
EKG QRS DURATION: 88 MS
EKG QTC CALCULATION (BAZETT): 445 MS
EKG R AXIS: 55 DEGREES
EKG T AXIS: 65 DEGREES
EKG VENTRICULAR RATE: 80 BPM
GLOBULIN SER CALC-MCNC: 4.1 G/DL (ref 2.3–3.5)
GLUCOSE SERPL-MCNC: 124 MG/DL (ref 70–99)
HCT VFR BLD AUTO: 28.6 % (ref 41.1–50.3)
HGB BLD-MCNC: 9.1 G/DL (ref 13.6–17.2)
HIV 1+2 AB+HIV1 P24 AG SERPL QL IA: NONREACTIVE
HIV 1/2 RESULT COMMENT: NORMAL
MAGNESIUM SERPL-MCNC: 1.6 MG/DL (ref 1.8–2.4)
PHOSPHATE SERPL-MCNC: 3.5 MG/DL (ref 2.5–4.5)
PLATELET # BLD AUTO: 152 K/UL (ref 150–450)
POTASSIUM SERPL-SCNC: 3.4 MMOL/L (ref 3.5–5.1)
PROT SERPL-MCNC: 6.3 G/DL (ref 6.3–8.2)
SODIUM SERPL-SCNC: 137 MMOL/L (ref 136–145)
T4 FREE SERPL-MCNC: 0.9 NG/DL (ref 0.9–1.7)
TSH W FREE THYROID IF ABNORMAL: 6.41 UIU/ML (ref 0.27–4.2)

## 2024-06-29 PROCEDURE — 36415 COLL VENOUS BLD VENIPUNCTURE: CPT

## 2024-06-29 PROCEDURE — 85049 AUTOMATED PLATELET COUNT: CPT

## 2024-06-29 PROCEDURE — 2580000003 HC RX 258: Performed by: FAMILY MEDICINE

## 2024-06-29 PROCEDURE — 83735 ASSAY OF MAGNESIUM: CPT

## 2024-06-29 PROCEDURE — 6370000000 HC RX 637 (ALT 250 FOR IP): Performed by: FAMILY MEDICINE

## 2024-06-29 PROCEDURE — 6360000002 HC RX W HCPCS: Performed by: INTERNAL MEDICINE

## 2024-06-29 PROCEDURE — 2580000003 HC RX 258: Performed by: INTERNAL MEDICINE

## 2024-06-29 PROCEDURE — 93010 ELECTROCARDIOGRAM REPORT: CPT | Performed by: INTERNAL MEDICINE

## 2024-06-29 PROCEDURE — 1100000000 HC RM PRIVATE

## 2024-06-29 PROCEDURE — 84439 ASSAY OF FREE THYROXINE: CPT

## 2024-06-29 PROCEDURE — 80048 BASIC METABOLIC PNL TOTAL CA: CPT

## 2024-06-29 PROCEDURE — 87389 HIV-1 AG W/HIV-1&-2 AB AG IA: CPT

## 2024-06-29 PROCEDURE — 6370000000 HC RX 637 (ALT 250 FOR IP): Performed by: NURSE PRACTITIONER

## 2024-06-29 PROCEDURE — 84443 ASSAY THYROID STIM HORMONE: CPT

## 2024-06-29 PROCEDURE — 80076 HEPATIC FUNCTION PANEL: CPT

## 2024-06-29 PROCEDURE — 85014 HEMATOCRIT: CPT

## 2024-06-29 PROCEDURE — 85018 HEMOGLOBIN: CPT

## 2024-06-29 PROCEDURE — 93005 ELECTROCARDIOGRAM TRACING: CPT | Performed by: FAMILY MEDICINE

## 2024-06-29 PROCEDURE — 84100 ASSAY OF PHOSPHORUS: CPT

## 2024-06-29 PROCEDURE — C9113 INJ PANTOPRAZOLE SODIUM, VIA: HCPCS | Performed by: INTERNAL MEDICINE

## 2024-06-29 PROCEDURE — 6370000000 HC RX 637 (ALT 250 FOR IP): Performed by: INTERNAL MEDICINE

## 2024-06-29 PROCEDURE — 51702 INSERT TEMP BLADDER CATH: CPT

## 2024-06-29 RX ORDER — FINASTERIDE 5 MG/1
5 TABLET, FILM COATED ORAL DAILY
Status: DISCONTINUED | OUTPATIENT
Start: 2024-06-29 | End: 2024-07-06 | Stop reason: HOSPADM

## 2024-06-29 RX ADMIN — SODIUM CHLORIDE, PRESERVATIVE FREE 10 ML: 5 INJECTION INTRAVENOUS at 21:57

## 2024-06-29 RX ADMIN — SODIUM CHLORIDE: 4.5 INJECTION, SOLUTION INTRAVENOUS at 08:12

## 2024-06-29 RX ADMIN — PANTOPRAZOLE SODIUM 40 MG: 40 INJECTION, POWDER, FOR SOLUTION INTRAVENOUS at 08:43

## 2024-06-29 RX ADMIN — HYDROCODONE BITARTRATE AND ACETAMINOPHEN 15 ML: 7.5; 325 SOLUTION ORAL at 11:40

## 2024-06-29 RX ADMIN — PANTOPRAZOLE SODIUM 40 MG: 40 INJECTION, POWDER, FOR SOLUTION INTRAVENOUS at 22:05

## 2024-06-29 RX ADMIN — FINASTERIDE 5 MG: 5 TABLET, FILM COATED ORAL at 08:37

## 2024-06-29 RX ADMIN — TAMSULOSIN HYDROCHLORIDE 0.4 MG: 0.4 CAPSULE ORAL at 08:11

## 2024-06-29 ASSESSMENT — PAIN DESCRIPTION - ORIENTATION: ORIENTATION: POSTERIOR

## 2024-06-29 ASSESSMENT — PAIN DESCRIPTION - LOCATION: LOCATION: BUTTOCKS

## 2024-06-29 ASSESSMENT — PAIN SCALES - GENERAL
PAINLEVEL_OUTOF10: 0
PAINLEVEL_OUTOF10: 0
PAINLEVEL_OUTOF10: 10

## 2024-06-29 ASSESSMENT — PAIN DESCRIPTION - DESCRIPTORS: DESCRIPTORS: SHARP;SORE

## 2024-06-29 NOTE — PROGRESS NOTES
Lab called the floor about skin sample that was sent down. They do not run the KOH test in house anymore. If the provider wants the fungal testing completed they can place an order for a fungal culture and it will be sent out to [facility].     MRI also called the floor. There is an order not to do any MRI until GFR >30. According to the MRI tech they do not look at GFR like they would for a CT with contrast. Asked hospitalist if he would like to still hold off on getting MRI completed. Waiting on response.

## 2024-06-29 NOTE — PROGRESS NOTES
Carlos Cox  Admission Date: 6/27/2024         [facility] Progress Note: 6/29/2024    Follow-up for: CRISTIN    The patient's chart is reviewed and the patient is discussed with the staff.    Subjective:     Patient seen and examined in room.  Sitting up in chair    ROS:  Gen - no fever, no chills, appetite unchanged  CV - no chest pain, no palpitation  Lung - no shortness of breath, no cough  Abd - no tenderness, no nausea/vomiting, no diarrhea  Ext - no edema    Current Facility-Administered Medications   Medication Dose Route Frequency    finasteride (PROSCAR) tablet 5 mg  5 mg Oral Daily    HYDROcodone-acetaminophen 2.5-108 mg/5 mL solution 15 mL  15 mL Oral Q4H PRN    0.45 % sodium chloride infusion   IntraVENous Continuous    diatrizoate meglumine-sodium (GASTROGRAFIN) 66-10 % solution 15 mL  15 mL Oral ONCE PRN    iopamidol (ISOVUE-370) 76 % injection 100 mL  100 mL IntraVENous ONCE PRN    sodium chloride flush 0.9 % injection 5-40 mL  5-40 mL IntraVENous 2 times per day    sodium chloride flush 0.9 % injection 5-40 mL  5-40 mL IntraVENous PRN    0.9 % sodium chloride infusion   IntraVENous PRN    ondansetron (ZOFRAN-ODT) disintegrating tablet 4 mg  4 mg Oral Q8H PRN    Or    ondansetron (ZOFRAN) injection 4 mg  4 mg IntraVENous Q6H PRN    polyethylene glycol (GLYCOLAX) packet 17 g  17 g Oral Daily PRN    acetaminophen (TYLENOL) tablet 650 mg  650 mg Oral Q6H PRN    Or    acetaminophen (TYLENOL) suppository 650 mg  650 mg Rectal Q6H PRN    pantoprazole (PROTONIX) 40 mg in sodium chloride (PF) 0.9 % 10 mL injection  40 mg IntraVENous Q12H    hyoscyamine (LEVSIN/SL) sublingual tablet 125 mcg  125 mcg SubLINGual Q4H PRN    tamsulosin (FLOMAX) capsule 0.4 mg  0.4 mg Oral Daily         Objective:     Vitals:    06/28/24 2256 06/29/24 0333 06/29/24 0510 06/29/24 0728   BP: 107/66 103/67  101/63   Pulse: 82 91  87   Resp: 16 16  17   Temp: 97.3 °F (36.3 °C) 97.5 °F (36.4 °C)  97.5 °F

## 2024-06-29 NOTE — CONSULTS
Comprehensive Nutrition Assessment    Type and Reason for Visit: Reassess, Consult  Malnutrition Screening Tool: Malnutrition Screen  Have you recently lost weight without trying?: 14 to 23 pounds (2 points)  Have you been eating poorly because of a decreased appetite?: Yes (1 point)  Malnutrition Screening Tool Score: 3  6/28- consult for poor po (hospitalist)    Nutrition Recommendations/Plan:   Meals and Snacks:  Diet: Continue current order  Nutrition Supplement Therapy:  Medical food supplement therapy:  Continue Ensure Enlive three times per day (this provides 350 kcal and 20 grams protein per bottle)     Malnutrition Assessment:  Malnutrition Assessment:  Malnutrition Status: Moderate malnutrition  Context: Chronic Illness  Findings of clinical characteristics of malnutrition:   Energy Intake:  No significant decrease in energy intake  Weight Loss:  Unable to assess     Body Fat Loss:  Mild body fat loss Buccal region, Triceps, Fat Overlying Ribs   Muscle Mass Loss:  Mild muscle mass loss Hand (interosseous), Clavicles (pectoralis & deltoids)  Fluid Accumulation:  Unable to assess     Strength:  Not Performed     Nutrition Assessment:  Nutrition History: Unable to assess as pt is off the floor.      Do You Have Any Cultural, Shinto, or Ethnic Food Preferences?: No   Weight History: No wt hx per EMR. Current weight still with low BMI.   Nutrition Background:       No PMH. Pt admitted with CRISTIN. Per H&P pt with wounds.   Nutrition Interval:  Patient in chair eating lunch. He states he tries to eat a bit more every day. He reports he is drinking the ensures. Discussed with RNCarol who reports he is eating great but does everything quite slowly.     Current Nutrition Therapies:  ADULT DIET; Easy to Chew  ADULT ORAL NUTRITION SUPPLEMENT; Breakfast, Lunch, Dinner; Standard High Calorie/High Protein Oral Supplement    Current Intake:   Average Meal Intake: % Average Supplements Intake: 51-75%

## 2024-06-29 NOTE — PROGRESS NOTES
END OF SHIFT NOTE:    INTAKE/OUTPUT  06/28 0701 - 06/29 0700  In: 2167.9 [P.O.:480; I.V.:1687.9]  Out: 1300 [Urine:1300]  Voiding: Yes  Catheter: Yes  Drain:   Urinary Catheter 06/27/24 Coude;2 Way (Active)   $ Urethral catheter insertion $ Not inserted for procedure 06/27/24 2011   Catheter Indications Urinary retention (acute or chronic), continuous bladder irrigation or bladder outlet obstruction 06/29/24 0800   Site Assessment Red 06/29/24 0800   Urine Color Bloody 06/29/24 0715   Urine Appearance Other (Comment) 06/28/24 0539   Collection Container Standard 06/29/24 0715   Securement Method Securing device (Describe) 06/29/24 0715   Catheter Care  Perineal wipes 06/29/24 1017   Catheter Best Practices  Drainage tube clipped to bed;Catheter secured to thigh;Tamper seal intact;Bag below bladder;Bag not on floor;Lack of dependent loop in tubing;Drainage bag less than half full 06/29/24 0715   Status Draining;Patent 06/29/24 0715   Output (mL) 400 mL 06/29/24 1801               Flatus: Patient does have flatus present.    Stool: 0 occurrences.    Characteristics:           Stool Assessment  Last BM (including prior to admit): 06/27/24    Emesis:  0 occurrences.    Characteristics:        VITAL SIGNS  Patient Vitals for the past 12 hrs:   Temp Pulse Resp BP SpO2   06/29/24 1518 97.5 °F (36.4 °C) 92 17 112/74 100 %   06/29/24 1210 -- -- 18 -- --   06/29/24 1116 97.5 °F (36.4 °C) 90 17 128/77 100 %   06/29/24 0728 97.5 °F (36.4 °C) 87 17 101/63 98 %       Pain Assessment  Pain Level: 0 (06/29/24 1210)  Pain Location: Buttocks  Patient's Stated Pain Goal: 0 - No pain    Ambulating  Yes    Shift report given to oncoming nurse at the bedside.    Carol Dent RN

## 2024-06-30 ENCOUNTER — APPOINTMENT (OUTPATIENT)
Dept: MRI IMAGING | Age: 69
End: 2024-06-30
Payer: MEDICARE

## 2024-06-30 LAB
ANION GAP SERPL CALC-SCNC: 10 MMOL/L (ref 9–18)
BUN SERPL-MCNC: 86 MG/DL (ref 8–23)
CALCIUM SERPL-MCNC: 7.7 MG/DL (ref 8.8–10.2)
CHLORIDE SERPL-SCNC: 106 MMOL/L (ref 98–107)
CO2 SERPL-SCNC: 19 MMOL/L (ref 20–28)
CREAT SERPL-MCNC: 3.16 MG/DL (ref 0.8–1.3)
GLUCOSE SERPL-MCNC: 112 MG/DL (ref 70–99)
HCT VFR BLD AUTO: 22 % (ref 41.1–50.3)
HCT VFR BLD AUTO: 26.2 % (ref 41.1–50.3)
HGB BLD-MCNC: 7.2 G/DL (ref 13.6–17.2)
HGB BLD-MCNC: 8.7 G/DL (ref 13.6–17.2)
MAGNESIUM SERPL-MCNC: 1.3 MG/DL (ref 1.8–2.4)
PLATELET # BLD AUTO: 135 K/UL (ref 150–450)
POTASSIUM SERPL-SCNC: 3.4 MMOL/L (ref 3.5–5.1)
SODIUM SERPL-SCNC: 136 MMOL/L (ref 136–145)

## 2024-06-30 PROCEDURE — 6370000000 HC RX 637 (ALT 250 FOR IP): Performed by: NURSE PRACTITIONER

## 2024-06-30 PROCEDURE — 1100000000 HC RM PRIVATE

## 2024-06-30 PROCEDURE — 6370000000 HC RX 637 (ALT 250 FOR IP): Performed by: FAMILY MEDICINE

## 2024-06-30 PROCEDURE — 6370000000 HC RX 637 (ALT 250 FOR IP): Performed by: INTERNAL MEDICINE

## 2024-06-30 PROCEDURE — 83735 ASSAY OF MAGNESIUM: CPT

## 2024-06-30 PROCEDURE — 6360000004 HC RX CONTRAST MEDICATION: Performed by: NURSE PRACTITIONER

## 2024-06-30 PROCEDURE — 6360000002 HC RX W HCPCS: Performed by: INTERNAL MEDICINE

## 2024-06-30 PROCEDURE — 72197 MRI PELVIS W/O & W/DYE: CPT

## 2024-06-30 PROCEDURE — 85018 HEMOGLOBIN: CPT

## 2024-06-30 PROCEDURE — A9579 GAD-BASE MR CONTRAST NOS,1ML: HCPCS | Performed by: NURSE PRACTITIONER

## 2024-06-30 PROCEDURE — 80048 BASIC METABOLIC PNL TOTAL CA: CPT

## 2024-06-30 PROCEDURE — 87101 SKIN FUNGI CULTURE: CPT

## 2024-06-30 PROCEDURE — 85014 HEMATOCRIT: CPT

## 2024-06-30 PROCEDURE — 36415 COLL VENOUS BLD VENIPUNCTURE: CPT

## 2024-06-30 PROCEDURE — 51798 US URINE CAPACITY MEASURE: CPT

## 2024-06-30 PROCEDURE — 2580000003 HC RX 258: Performed by: FAMILY MEDICINE

## 2024-06-30 PROCEDURE — C9113 INJ PANTOPRAZOLE SODIUM, VIA: HCPCS | Performed by: INTERNAL MEDICINE

## 2024-06-30 PROCEDURE — 2580000003 HC RX 258: Performed by: INTERNAL MEDICINE

## 2024-06-30 PROCEDURE — 99231 SBSQ HOSP IP/OBS SF/LOW 25: CPT | Performed by: NURSE PRACTITIONER

## 2024-06-30 PROCEDURE — 85049 AUTOMATED PLATELET COUNT: CPT

## 2024-06-30 PROCEDURE — 72158 MRI LUMBAR SPINE W/O & W/DYE: CPT

## 2024-06-30 RX ORDER — LANOLIN ALCOHOL/MO/W.PET/CERES
400 CREAM (GRAM) TOPICAL ONCE
Status: COMPLETED | OUTPATIENT
Start: 2024-06-30 | End: 2024-06-30

## 2024-06-30 RX ORDER — PANTOPRAZOLE SODIUM 40 MG/1
40 TABLET, DELAYED RELEASE ORAL
Status: DISCONTINUED | OUTPATIENT
Start: 2024-07-01 | End: 2024-07-06 | Stop reason: HOSPADM

## 2024-06-30 RX ORDER — POTASSIUM CHLORIDE 20 MEQ/1
10 TABLET, EXTENDED RELEASE ORAL ONCE
Status: COMPLETED | OUTPATIENT
Start: 2024-06-30 | End: 2024-06-30

## 2024-06-30 RX ADMIN — PANTOPRAZOLE SODIUM 40 MG: 40 INJECTION, POWDER, FOR SOLUTION INTRAVENOUS at 08:34

## 2024-06-30 RX ADMIN — POTASSIUM CHLORIDE 10 MEQ: 1500 TABLET, EXTENDED RELEASE ORAL at 19:55

## 2024-06-30 RX ADMIN — TAMSULOSIN HYDROCHLORIDE 0.4 MG: 0.4 CAPSULE ORAL at 07:39

## 2024-06-30 RX ADMIN — FINASTERIDE 5 MG: 5 TABLET, FILM COATED ORAL at 07:39

## 2024-06-30 RX ADMIN — MAGNESIUM GLUCONATE 500 MG ORAL TABLET 400 MG: 500 TABLET ORAL at 07:39

## 2024-06-30 RX ADMIN — POTASSIUM CHLORIDE 10 MEQ: 1500 TABLET, EXTENDED RELEASE ORAL at 07:39

## 2024-06-30 RX ADMIN — HYDROCODONE BITARTRATE AND ACETAMINOPHEN 15 ML: 7.5; 325 SOLUTION ORAL at 18:37

## 2024-06-30 RX ADMIN — SODIUM CHLORIDE: 4.5 INJECTION, SOLUTION INTRAVENOUS at 04:24

## 2024-06-30 RX ADMIN — GADOTERIDOL 9 ML: 279.3 INJECTION, SOLUTION INTRAVENOUS at 14:39

## 2024-06-30 RX ADMIN — SODIUM CHLORIDE, PRESERVATIVE FREE 10 ML: 5 INJECTION INTRAVENOUS at 19:58

## 2024-06-30 RX ADMIN — MAGNESIUM GLUCONATE 500 MG ORAL TABLET 400 MG: 500 TABLET ORAL at 19:54

## 2024-06-30 ASSESSMENT — PAIN SCALES - GENERAL
PAINLEVEL_OUTOF10: 0
PAINLEVEL_OUTOF10: 6

## 2024-06-30 ASSESSMENT — PAIN DESCRIPTION - ORIENTATION: ORIENTATION: RIGHT

## 2024-06-30 ASSESSMENT — PAIN DESCRIPTION - DESCRIPTORS: DESCRIPTORS: ACHING;DISCOMFORT

## 2024-06-30 ASSESSMENT — PAIN DESCRIPTION - LOCATION: LOCATION: ANKLE

## 2024-06-30 NOTE — PROGRESS NOTES
Spoke with RN on phone and was given the go-ahead to proceed with contrast after she had looked at the lab results. NANCY

## 2024-06-30 NOTE — PROGRESS NOTES
Carlos Cox  Admission Date: 6/27/2024         [facility] Progress Note: 6/30/2024    Follow-up for: CRISTIN    The patient's chart is reviewed and the patient is discussed with the staff.    Subjective:     Patient seen and examined in room.  Sitting up in chair    ROS:  Gen - no fever, no chills, appetite unchanged  CV - no chest pain, no palpitation  Lung - no shortness of breath, no cough  Abd - no tenderness, no nausea/vomiting, no diarrhea  Ext - no edema    Current Facility-Administered Medications   Medication Dose Route Frequency    finasteride (PROSCAR) tablet 5 mg  5 mg Oral Daily    HYDROcodone-acetaminophen 2.5-108 mg/5 mL solution 15 mL  15 mL Oral Q4H PRN    0.45 % sodium chloride infusion   IntraVENous Continuous    diatrizoate meglumine-sodium (GASTROGRAFIN) 66-10 % solution 15 mL  15 mL Oral ONCE PRN    iopamidol (ISOVUE-370) 76 % injection 100 mL  100 mL IntraVENous ONCE PRN    sodium chloride flush 0.9 % injection 5-40 mL  5-40 mL IntraVENous 2 times per day    sodium chloride flush 0.9 % injection 5-40 mL  5-40 mL IntraVENous PRN    0.9 % sodium chloride infusion   IntraVENous PRN    ondansetron (ZOFRAN-ODT) disintegrating tablet 4 mg  4 mg Oral Q8H PRN    Or    ondansetron (ZOFRAN) injection 4 mg  4 mg IntraVENous Q6H PRN    polyethylene glycol (GLYCOLAX) packet 17 g  17 g Oral Daily PRN    acetaminophen (TYLENOL) tablet 650 mg  650 mg Oral Q6H PRN    Or    acetaminophen (TYLENOL) suppository 650 mg  650 mg Rectal Q6H PRN    pantoprazole (PROTONIX) 40 mg in sodium chloride (PF) 0.9 % 10 mL injection  40 mg IntraVENous Q12H    hyoscyamine (LEVSIN/SL) sublingual tablet 125 mcg  125 mcg SubLINGual Q4H PRN    tamsulosin (FLOMAX) capsule 0.4 mg  0.4 mg Oral Daily         Objective:     Vitals:    06/29/24 1923 06/29/24 2359 06/30/24 0344 06/30/24 0752   BP: 119/75 133/67 122/74 119/79   Pulse: 91 (!) 103 95 96   Resp: 16 16 16 18   Temp: 97.5 °F (36.4 °C) 97.7 °F (36.5

## 2024-06-30 NOTE — PROGRESS NOTES
Admit Date: 6/27/2024      Subjective:     Carlos Cox is resting in bed. No new complaints. Awaiting prostate MRI ( hopefully today) MRI contrast does not affect the kidney compared to CT contrast. Colby patent with clearing hematuria. Will need further hematuria work up out patient.    Objective:     Patient Vitals for the past 8 hrs:   BP Temp Temp src Pulse Resp SpO2   06/30/24 0752 119/79 98.1 °F (36.7 °C) Oral 96 18 100 %   06/30/24 0344 122/74 98.2 °F (36.8 °C) Oral 95 16 98 %     06/30 0701 - 06/30 1900  In: -   Out: 700 [Urine:700]  06/28 1901 - 06/30 0700  In: 4164.7 [P.O.:800; I.V.:3364.7]  Out: 2650 [Urine:2650]    Physical Exam:  GENERAL ASSESSMENT: alert, oriented to person, place and time, no acute distress and no anxiety, depression or agitation  Chest: normal work of breathing  CVS exam: normal rate, regular rhythm, normal S1, S2, no murmurs, rubs, clicks or gallops.  ABDOMEN: not done  Neurological exam reveals alert, oriented, normal speech, no focal findings or movement disorder noted.  FEMALE GENITOURINARY EXAM: not done  MALE GENITAL EXAM: not done    Data Review   Recent Results (from the past 24 hour(s))   Basic Metabolic Panel    Collection Time: 06/29/24 11:14 AM   Result Value Ref Range    Sodium 137 136 - 145 mmol/L    Potassium 3.4 (L) 3.5 - 5.1 mmol/L    Chloride 107 98 - 107 mmol/L    CO2 18 (L) 20 - 28 mmol/L    Anion Gap 12 9 - 18 mmol/L    Glucose 124 (H) 70 - 99 mg/dL     (H) 8 - 23 MG/DL    Creatinine 3.39 (H) 0.80 - 1.30 MG/DL    Est, Glom Filt Rate 19 (L) >60 ml/min/1.73m2    Calcium 8.2 (L) 8.8 - 10.2 MG/DL   Magnesium    Collection Time: 06/29/24 11:14 AM   Result Value Ref Range    Magnesium 1.6 (L) 1.8 - 2.4 mg/dL   Hepatic Function Panel    Collection Time: 06/29/24 11:14 AM   Result Value Ref Range    Total Protein 6.3 6.3 - 8.2 g/dL    Albumin 2.2 (L) 3.2 - 4.6 g/dL    Globulin 4.1 (H) 2.3 - 3.5 g/dL    Albumin/Globulin Ratio 0.5 (L) 1.0 - 1.9      Total  Moderate protein-calorie malnutrition (HCC)    Tinea pedis  Resolved Problems:    * No resolved hospital problems. *    Nowak draining clear/peach UOP. Cr. 3.16 improved from admission (6.52). HGB 7.2. VSS.    Pre-Op Diagnosis: * No surgery found *    Post-Op Diagnosis:  * No surgery found *    Procedures: * No surgery found *      Plan:   Medical management per primary team.  Awaiting prostate MRI.  Keep nowak draining.  Will need out patient hematuria work up.  Will follow.      Signed By: LAW BermudezC     June 30, 2024      [facility]    I have reviewed the above note.  I agree with the HPI, exam, assessment and plan as outlined by the nurse practitioner.    Jeremy Patten M.D.    [facility]  [address] [address]  Phone: [phone]  Fax: [phone]

## 2024-06-30 NOTE — PROGRESS NOTES
Hospitalist Progress Note   Admit Date:  2024  6:52 PM   Name:  Carlos Cox   Age:  68 y.o.  Sex:  male  :  1955   MRN:  [id number]   Room:  216/    Presenting/Chief Complaint: Fall and Rectal Bleeding     Reason(s) for Admission: Melena [K92.1]  CRISTIN (acute kidney injury) (HCC) [N17.9]  Acute kidney injury (HCC) [N17.9]  Acute urinary retention [R33.8]     Hospital Course:     Carlos Cox is a 68 y.o. male with medical history of  none, living alone, who is evaluated with abdominal pain/ decreased urination for 2 weeks.  Notices appetite has been poor as well.    In the ER, Found with urine retention 4 L in bladder s/p nowak. Creatinine 6.52/ . Receiving IVF. Bicarb 16 , anion gap 21. Pt Not aware of any prostate issues  Had pain after nowak placed. Had some dark stools, but taking peptobismol. Denies NSAID use .HGB 9.9. Albumin 2.6     He is able to urinate but seems much less. He has no local family or friends/ his mother lives in [place] and would be his next of kin and first emergency contact. [place] [phone]. Can drive, Tried to get to the store and stocks up on food but is hard, and has lost weight.     Disheveled and has skin changes to LE  Can't walk  Chokes with eating and feels as if something is stuck in mouth and wants to brush his teeth     Subjective & 24hr Events:   No acute events overnight. Carlos feeling much better today.  Prior pain in the suprapubic region resolved..  Now has a pain in his lower back region.  Clarifies today that he did fall approximately a week prior to admission.  No new urinary incontinence or retention after there, symptoms were present prior to this.  No sensory changes.  Urinary retention was gradual and not abrupt.  No further bloody/black stools.  No additional complaints at this time otherwise.     Slept: Well    Eating: Poorly    Drinking: Poorly    Stooling: Okay    Voiding: Okay, though hematuria    Pain: as above    ROS  Pertinent

## 2024-06-30 NOTE — PROGRESS NOTES
retention resolved with Colby insertion  Continues to improve as expected with IVF and post obstruction decompression  Creatinine on admission: 6.5 to  Creatinine today: 3.16  06/30/24-improving  Plan  Nephrology consulted-recommendations as below  Strict I+Os, daily weights, avoid nephrotoxic medications, renally dose medications, monitor lytes  Maintain BP   Continue Flomax 0.4 mg daily  Continue finasteride  Mgmt  Fluids half-normal saline at 100 mL an hour  Renal US: No hydronephrosis, increased echogenicity suggesting underlying medical renal disease  AM BMP    Prostatomegaly  Concerning for possible prostate cancer  PSA 27.6  Urology consulted and assisting with management  Continue Flomax as above, finasteride by urology  Obtain MRI of the prostate as recommended by urology, add MRI of the lumbar spine as well  Continue Colby insertion and management as above for now    Hematuria  Reportedly present prior to Colby insertion  Urology consulted, defer inpatient management   Continue to follow recommendations    Normocytic anemia  No prior baseline for comparison  Hemoglobin on arrival: 9.9  Hemoglobin today: 7.2  No bleeding on ROS or exam (except hematuria)  Complete nutritional studies without reversible deficiency  Probable anemia of chronic disease though will require further evaluation outpatient  AM CBC, also recheck H&H later today suspect hemodilution    Frailty and cachexia  Concerning for metastatic cancer but CT CAP without evidence of metastatic disease except for prostate megaly and L1 compression fracture as above  Consult nutritionist  HIV negative, TSH euthyroid    Other active/chronic issues:  Compression fracture of L1-age-indeterminate.  Concerning in setting of prostamegaly.  New reports of an old fall approximately a week prior to admission. No Ssx of cauda equina, but does have bilateral LE clonus. Urology requesting MRI prostate, so will add lumbar spine MRI as well-pending  Melanotic  4:24 AM   Result Value Ref Range    TSH w Free Thyroid if Abnormal 6.41 (H) 0.27 - 4.20 UIU/ML   T4, Free    Collection Time: 06/29/24  4:24 AM   Result Value Ref Range    T4 Free 0.9 0.9 - 1.7 NG/DL   Basic Metabolic Panel    Collection Time: 06/29/24 11:14 AM   Result Value Ref Range    Sodium 137 136 - 145 mmol/L    Potassium 3.4 (L) 3.5 - 5.1 mmol/L    Chloride 107 98 - 107 mmol/L    CO2 18 (L) 20 - 28 mmol/L    Anion Gap 12 9 - 18 mmol/L    Glucose 124 (H) 70 - 99 mg/dL     (H) 8 - 23 MG/DL    Creatinine 3.39 (H) 0.80 - 1.30 MG/DL    Est, Glom Filt Rate 19 (L) >60 ml/min/1.73m2    Calcium 8.2 (L) 8.8 - 10.2 MG/DL   Magnesium    Collection Time: 06/29/24 11:14 AM   Result Value Ref Range    Magnesium 1.6 (L) 1.8 - 2.4 mg/dL   Hepatic Function Panel    Collection Time: 06/29/24 11:14 AM   Result Value Ref Range    Total Protein 6.3 6.3 - 8.2 g/dL    Albumin 2.2 (L) 3.2 - 4.6 g/dL    Globulin 4.1 (H) 2.3 - 3.5 g/dL    Albumin/Globulin Ratio 0.5 (L) 1.0 - 1.9      Total Bilirubin <0.2 0.0 - 1.2 MG/DL    Bilirubin, Direct <0.2 0.0 - 0.4 MG/DL    Alk Phosphatase 52 40 - 129 U/L    AST 26 15 - 37 U/L    ALT 12 12 - 65 U/L   Hemoglobin and Hematocrit    Collection Time: 06/29/24 11:14 AM   Result Value Ref Range    Hemoglobin 9.1 (L) 13.6 - 17.2 g/dL    Hematocrit 28.6 (L) 41.1 - 50.3 %   Platelet Count    Collection Time: 06/29/24 11:14 AM   Result Value Ref Range    Platelets 152 150 - 450 K/uL   EKG 12 Lead    Collection Time: 06/29/24 12:46 PM   Result Value Ref Range    Ventricular Rate 80 BPM    Atrial Rate 80 BPM    P-R Interval 144 ms    QRS Duration 88 ms    Q-T Interval 386 ms    QTc Calculation (Bazett) 445 ms    P Axis 72 degrees    R Axis 55 degrees    T Axis 65 degrees    Diagnosis       Sinus rhythm with Premature atrial complexes  Otherwise normal ECG  No previous ECGs available  Confirmed by MARIELA PARISI (), LEVI TANNER ([id number]) on 6/29/2024 1:05:06 PM     Basic Metabolic Panel w/ Reflex to MG

## 2024-06-30 NOTE — PLAN OF CARE
Problem: Discharge Planning  Goal: Discharge to home or other facility with appropriate resources  6/30/2024 0909 by Carol Dent, RN  Outcome: Progressing  6/30/2024 0309 by Mery Denny RN  Outcome: Progressing     Problem: Pain  Goal: Verbalizes/displays adequate comfort level or baseline comfort level  6/30/2024 0909 by Carol Dent, RN  Outcome: Progressing  6/30/2024 0309 by Mery Denny RN  Outcome: Progressing     Problem: Skin/Tissue Integrity  Goal: Absence of new skin breakdown  Description: 1.  Monitor for areas of redness and/or skin breakdown  2.  Assess vascular access sites hourly  3.  Every 4-6 hours minimum:  Change oxygen saturation probe site  4.  Every 4-6 hours:  If on nasal continuous positive airway pressure, respiratory therapy assess nares and determine need for appliance change or resting period.  6/30/2024 0909 by Carol Dent, RN  Outcome: Progressing  6/30/2024 0309 by Mery Denny RN  Outcome: Progressing     Problem: Safety - Adult  Goal: Free from fall injury  6/30/2024 0909 by Carol Dent, RN  Outcome: Progressing  6/30/2024 0309 by Mery Denny RN  Outcome: Progressing

## 2024-06-30 NOTE — PROGRESS NOTES
END OF SHIFT NOTE:    INTAKE/OUTPUT  06/29 0701 - 06/30 0700  In: 4028 [P.O.:800; I.V.:3228]  Out: 2150 [Urine:2150]  Voiding: Yes  Catheter: Yes  Drain:   Urinary Catheter 06/27/24 Coude;2 Way (Active)   $ Urethral catheter insertion $ Not inserted for procedure 06/29/24 2208   Catheter Indications Urinary retention (acute or chronic), continuous bladder irrigation or bladder outlet obstruction 06/30/24 0800   Site Assessment Red 06/30/24 0800   Urine Color Bloody 06/30/24 0715   Urine Appearance Clots 06/30/24 0715   Collection Container Standard 06/30/24 0715   Securement Method Securing device (Describe) 06/30/24 0715   Catheter Care  Perineal wipes 06/29/24 1017   Catheter Best Practices  Drainage tube clipped to bed;Catheter secured to thigh;Tamper seal intact;Bag below bladder;Bag not on floor;Lack of dependent loop in tubing;Drainage bag less than half full 06/30/24 0715   Status Draining;Patent 06/30/24 0715   Output (mL) 800 mL 06/30/24 1101               Flatus: Patient does have flatus present.    Stool: 0 occurrences.    Characteristics:           Stool Assessment  Last BM (including prior to admit): 06/27/24    Emesis: 0 occurrences.    Characteristics:        VITAL SIGNS  Patient Vitals for the past 12 hrs:   Temp Pulse Resp BP SpO2   06/30/24 1543 98.2 °F (36.8 °C) 97 18 133/79 99 %   06/30/24 1057 98.2 °F (36.8 °C) (!) 101 18 129/87 100 %   06/30/24 0752 98.1 °F (36.7 °C) 96 18 119/79 100 %       Pain Assessment  Pain Level: 0 (06/30/24 0715)  Pain Location: Buttocks  Patient's Stated Pain Goal: 0 - No pain    Ambulating  Yes    Shift report given to oncoming nurse at the bedside.    Carol Dent RN

## 2024-06-30 NOTE — PROGRESS NOTES
Pt morning labs resulted: K 3.4, mag 1.3, Hgb 7.2 and GFR 20. No replacements ordered, messaged hospitalist. Per hospitalist- electrolytes need to be replaced slowly due to GFR being low. Ordered one time PO dose of potassium and magnesium.

## 2024-07-01 LAB
ALBUMIN SERPL-MCNC: 1.8 G/DL (ref 3.2–4.6)
ALBUMIN/GLOB SERPL: 0.5 (ref 1–1.9)
ALP SERPL-CCNC: 45 U/L (ref 40–129)
ALT SERPL-CCNC: 14 U/L (ref 12–65)
ANION GAP SERPL CALC-SCNC: 10 MMOL/L (ref 9–18)
AST SERPL-CCNC: 23 U/L (ref 15–37)
BILIRUB DIRECT SERPL-MCNC: <0.2 MG/DL (ref 0–0.4)
BILIRUB SERPL-MCNC: <0.2 MG/DL (ref 0–1.2)
BUN SERPL-MCNC: 66 MG/DL (ref 8–23)
CALCIUM SERPL-MCNC: 7.9 MG/DL (ref 8.8–10.2)
CHLORIDE SERPL-SCNC: 102 MMOL/L (ref 98–107)
CO2 SERPL-SCNC: 24 MMOL/L (ref 20–28)
CREAT SERPL-MCNC: 2.28 MG/DL (ref 0.8–1.3)
GLOBULIN SER CALC-MCNC: 3.6 G/DL (ref 2.3–3.5)
GLUCOSE SERPL-MCNC: 93 MG/DL (ref 70–99)
HCT VFR BLD AUTO: 24.5 % (ref 41.1–50.3)
HGB BLD-MCNC: 8.3 G/DL (ref 13.6–17.2)
PLATELET # BLD AUTO: 152 K/UL (ref 150–450)
POTASSIUM SERPL-SCNC: 3.6 MMOL/L (ref 3.5–5.1)
PROT SERPL-MCNC: 5.4 G/DL (ref 6.3–8.2)
SODIUM SERPL-SCNC: 135 MMOL/L (ref 136–145)

## 2024-07-01 PROCEDURE — 97535 SELF CARE MNGMENT TRAINING: CPT

## 2024-07-01 PROCEDURE — 6370000000 HC RX 637 (ALT 250 FOR IP): Performed by: NURSE PRACTITIONER

## 2024-07-01 PROCEDURE — 2580000003 HC RX 258: Performed by: INTERNAL MEDICINE

## 2024-07-01 PROCEDURE — 97110 THERAPEUTIC EXERCISES: CPT

## 2024-07-01 PROCEDURE — 85018 HEMOGLOBIN: CPT

## 2024-07-01 PROCEDURE — 36415 COLL VENOUS BLD VENIPUNCTURE: CPT

## 2024-07-01 PROCEDURE — 85014 HEMATOCRIT: CPT

## 2024-07-01 PROCEDURE — 6370000000 HC RX 637 (ALT 250 FOR IP): Performed by: INTERNAL MEDICINE

## 2024-07-01 PROCEDURE — 80076 HEPATIC FUNCTION PANEL: CPT

## 2024-07-01 PROCEDURE — 6370000000 HC RX 637 (ALT 250 FOR IP): Performed by: FAMILY MEDICINE

## 2024-07-01 PROCEDURE — 2580000003 HC RX 258: Performed by: FAMILY MEDICINE

## 2024-07-01 PROCEDURE — 1100000000 HC RM PRIVATE

## 2024-07-01 PROCEDURE — 97530 THERAPEUTIC ACTIVITIES: CPT

## 2024-07-01 PROCEDURE — 99221 1ST HOSP IP/OBS SF/LOW 40: CPT | Performed by: STUDENT IN AN ORGANIZED HEALTH CARE EDUCATION/TRAINING PROGRAM

## 2024-07-01 PROCEDURE — 85049 AUTOMATED PLATELET COUNT: CPT

## 2024-07-01 PROCEDURE — 80048 BASIC METABOLIC PNL TOTAL CA: CPT

## 2024-07-01 RX ORDER — KETOCONAZOLE 200 MG/1
200 TABLET ORAL ONCE
Status: DISCONTINUED | OUTPATIENT
Start: 2024-07-01 | End: 2024-07-02 | Stop reason: RX

## 2024-07-01 RX ORDER — SODIUM CHLORIDE 9 MG/ML
INJECTION, SOLUTION INTRAVENOUS CONTINUOUS
Status: DISCONTINUED | OUTPATIENT
Start: 2024-07-01 | End: 2024-07-03

## 2024-07-01 RX ADMIN — PANTOPRAZOLE SODIUM 40 MG: 40 TABLET, DELAYED RELEASE ORAL at 05:55

## 2024-07-01 RX ADMIN — HYDROCODONE BITARTRATE AND ACETAMINOPHEN 15 ML: 7.5; 325 SOLUTION ORAL at 10:29

## 2024-07-01 RX ADMIN — SODIUM CHLORIDE: 9 INJECTION, SOLUTION INTRAVENOUS at 09:09

## 2024-07-01 RX ADMIN — SODIUM CHLORIDE: 9 INJECTION, SOLUTION INTRAVENOUS at 20:46

## 2024-07-01 RX ADMIN — TAMSULOSIN HYDROCHLORIDE 0.4 MG: 0.4 CAPSULE ORAL at 08:51

## 2024-07-01 RX ADMIN — SODIUM CHLORIDE, PRESERVATIVE FREE 10 ML: 5 INJECTION INTRAVENOUS at 22:11

## 2024-07-01 RX ADMIN — SODIUM CHLORIDE, PRESERVATIVE FREE 10 ML: 5 INJECTION INTRAVENOUS at 09:09

## 2024-07-01 RX ADMIN — FINASTERIDE 5 MG: 5 TABLET, FILM COATED ORAL at 08:51

## 2024-07-01 RX ADMIN — SODIUM CHLORIDE: 4.5 INJECTION, SOLUTION INTRAVENOUS at 02:54

## 2024-07-01 ASSESSMENT — PAIN DESCRIPTION - ORIENTATION
ORIENTATION: RIGHT
ORIENTATION: RIGHT

## 2024-07-01 ASSESSMENT — PAIN DESCRIPTION - PAIN TYPE: TYPE: ACUTE PAIN

## 2024-07-01 ASSESSMENT — PAIN SCALES - GENERAL
PAINLEVEL_OUTOF10: 10
PAINLEVEL_OUTOF10: 5
PAINLEVEL_OUTOF10: 1

## 2024-07-01 ASSESSMENT — PAIN DESCRIPTION - LOCATION
LOCATION: ANKLE
LOCATION: LEG

## 2024-07-01 ASSESSMENT — PAIN DESCRIPTION - FREQUENCY: FREQUENCY: INTERMITTENT

## 2024-07-01 ASSESSMENT — PAIN DESCRIPTION - DESCRIPTORS: DESCRIPTORS: THROBBING;STABBING

## 2024-07-01 ASSESSMENT — PAIN DESCRIPTION - ONSET: ONSET: PROGRESSIVE

## 2024-07-01 NOTE — PROGRESS NOTES
Hospitalist Progress Note   Admit Date:  2024  6:52 PM   Name:  Carlos Cox   Age:  68 y.o.  Sex:  male  :  1955   MRN:  [id number]   Room:  216/    Presenting/Chief Complaint: Fall and Rectal Bleeding     Reason(s) for Admission: Melena [K92.1]  CRISTIN (acute kidney injury) (HCC) [N17.9]  Acute kidney injury (HCC) [N17.9]  Acute urinary retention [R33.8]     Hospital Course:     Carlos Cox is a 68 y.o. male with medical history of  none, living alone, who is evaluated with abdominal pain/ decreased urination for 2 weeks.  Notices appetite has been poor as well.    In the ER, Found with urine retention 4 L in bladder s/p nowak. Creatinine 6.52/ . Receiving IVF. Bicarb 16 , anion gap 21. Pt Not aware of any prostate issues  Had pain after nowak placed. Had some dark stools, but taking peptobismol. Denies NSAID use .HGB 9.9. Albumin 2.6     He is able to urinate but seems much less. He has no local family or friends/ his mother lives in [place] and would be his next of kin and first emergency contact. [place] [phone]. Can drive, Tried to get to the store and stocks up on food but is hard, and has lost weight.     Disheveled and has skin changes to LE  Can't walk  Chokes with eating and feels as if something is stuck in mouth and wants to brush his teeth     Subjective & 24hr Events:   No acute events overnight. Carlos feeling much better today.  Prior pain in the suprapubic region resolved..  Pain in lower back region improving.  Hematuria is improving.    Slept: Well    Eating: Fair    Drinking: Fair    Stooling: Okay    Voiding: Okay    Pain: as above    ROS  Pertinent positives:  As outlined above    Pertinent negatives:  Chest Pain, Shortness of Breath, and Bloody or black stools, no focal weakness, saddle anesthesia, new urinary/stool incontinence/retention    Assessment & Plan:     Acute Kidney Injury   Assessment  Suspect postobstructive renal failure with large volume urinary  retention resolved with Colby insertion  Continues to improve as expected with IVF and post obstruction decompression  Creatinine on admission: 6.5 to  Creatinine today: 2.28  07/01/24-improving  Plan  Nephrology consulted-recommendations as below  Strict I+Os, daily weights, avoid nephrotoxic medications, renally dose medications, monitor lytes  Maintain BP   Continue Flomax 0.4 mg daily  Continue finasteride  Mgmt  Fluids half-normal saline at 100 mL an hour  Renal US: No hydronephrosis, increased echogenicity suggesting underlying medical renal disease  AM BMP    Prostatomegaly  Concerning for possible prostate cancer  PSA 27.6  Urology consulted and assisting with management  Continue Flomax as above, finasteride by urology  Obtain MRI of the prostate as recommended by urology, add MRI of the lumbar spine as well  Continue Colby insertion and management as above for now    Hematuria  07/01/24-improving  Reportedly present prior to Colby insertion  Hematuria is clearing up  Urology consulted, defer inpatient management   Continue to follow recommendations    Normocytic anemia  No prior baseline for comparison  Hemoglobin on arrival: 9.9  Hemoglobin today: 8.3  No bleeding on ROS or exam (except hematuria)  Complete nutritional studies without reversible deficiency  Probable anemia of chronic disease though will require further evaluation outpatient  AM CBC, also recheck H&H later today suspect hemodilution    Frailty and cachexia  Concerning for metastatic cancer but CT CAP without evidence of metastatic disease except for prostate megaly and L1 compression fracture as above  Consult nutritionist  HIV negative, TSH euthyroid    Other active/chronic issues:  Compression fracture of L1-age-indeterminate.  Concerning in setting of prostamegaly.  New reports of an old fall approximately a week prior to admission. No Ssx of cauda equina, but does have bilateral LE clonus. Urology requesting MRI prostate, so added MRI

## 2024-07-01 NOTE — WOUND CARE
Bilateral lower legs with hemosiderin staining and dry exudate/ fungal towers. Severe fungal towers and dry skin on feet and all toes.  Atrophic nails with likely fungus in each nail.  Likely to loose all 10 toenails to fungal infection. Noted plans by primary to add antifungal treatment. Today areas soaked with warm towels and CHG, large plaques removed with bathing and mechanically.  Antifungal ointment applied and xeroform added to open areas on shins. Left with ulcer,padded with abd and both shins wrapped with rolled gauze. Will need changed daily.  Heels will need protected by floating or heel boots.                                 Sacral area with shallow open areas in pear shaped redness, stage 2 pressure injury, present on admission. Recommend pink foam dressing change every other day.  Offload and turn frequently.

## 2024-07-01 NOTE — PROGRESS NOTES
Carlos Cox  Admission Date: 6/27/2024         [facility] Progress Note: 7/1/2024    Follow-up for: CRISTIN    The patient's chart is reviewed and the patient is discussed with the staff.    Subjective:     Patient seen and examined in room.    ROS:  Gen - no fever, no chills, appetite unchanged  CV - no chest pain, no palpitation  Lung - no shortness of breath, no cough  Abd - no tenderness, no nausea/vomiting, no diarrhea  Ext - no edema    Current Facility-Administered Medications   Medication Dose Route Frequency    pantoprazole (PROTONIX) tablet 40 mg  40 mg Oral QAM AC    finasteride (PROSCAR) tablet 5 mg  5 mg Oral Daily    HYDROcodone-acetaminophen 2.5-108 mg/5 mL solution 15 mL  15 mL Oral Q4H PRN    0.45 % sodium chloride infusion   IntraVENous Continuous    diatrizoate meglumine-sodium (GASTROGRAFIN) 66-10 % solution 15 mL  15 mL Oral ONCE PRN    iopamidol (ISOVUE-370) 76 % injection 100 mL  100 mL IntraVENous ONCE PRN    sodium chloride flush 0.9 % injection 5-40 mL  5-40 mL IntraVENous 2 times per day    sodium chloride flush 0.9 % injection 5-40 mL  5-40 mL IntraVENous PRN    0.9 % sodium chloride infusion   IntraVENous PRN    ondansetron (ZOFRAN-ODT) disintegrating tablet 4 mg  4 mg Oral Q8H PRN    Or    ondansetron (ZOFRAN) injection 4 mg  4 mg IntraVENous Q6H PRN    polyethylene glycol (GLYCOLAX) packet 17 g  17 g Oral Daily PRN    acetaminophen (TYLENOL) tablet 650 mg  650 mg Oral Q6H PRN    Or    acetaminophen (TYLENOL) suppository 650 mg  650 mg Rectal Q6H PRN    hyoscyamine (LEVSIN/SL) sublingual tablet 125 mcg  125 mcg SubLINGual Q4H PRN    tamsulosin (FLOMAX) capsule 0.4 mg  0.4 mg Oral Daily         Objective:     Vitals:    06/30/24 1837 06/30/24 2330 07/01/24 0336 07/01/24 0719   BP:  105/67 115/82 116/78   Pulse:  (!) 105 (!) 102 (!) 108   Resp: 18 16 16 17   Temp:  98.2 °F (36.8 °C) 98.2 °F (36.8 °C) 98.1 °F (36.7 °C)   TempSrc:  Oral Oral Oral   SpO2:  98% 98%

## 2024-07-01 NOTE — CARE COORDINATION
RNCM: PT/OT recommending SNF at discharge. RNCM met with patient in room 216 to discuss discharge planning. Skilled choice list provided for review. Referrals made per patient request. Patient accepts bed offer at [facility]. CM following

## 2024-07-01 NOTE — PROGRESS NOTES
END OF SHIFT NOTE:    INTAKE/OUTPUT  06/30 0701 - 07/01 0700  In: 1984.3 [I.V.:1984.3]  Out: 3950 [Urine:3950]  Voiding: No  Catheter: Yes  Drain:   Urinary Catheter 06/27/24 Coude;2 Way (Active)   $ Urethral catheter insertion $ Not inserted for procedure 06/29/24 2208   Catheter Indications Urinary retention (acute or chronic), continuous bladder irrigation or bladder outlet obstruction 07/01/24 1029   Site Assessment No urethral drainage 07/01/24 1029   Urine Color Yellow 07/01/24 1029   Urine Appearance Clear 07/01/24 1029   Collection Container Standard 07/01/24 1029   Securement Method Securing device (Describe) 07/01/24 1029   Catheter Care  Perineal wipes 07/01/24 0855   Catheter Best Practices  Drainage tube clipped to bed;Catheter secured to thigh;Tamper seal intact;Bag below bladder;Bag not on floor;Lack of dependent loop in tubing;Drainage bag less than half full 07/01/24 1029   Status Draining;Patent 07/01/24 1029   Output (mL) 325 mL 07/01/24 1510               Flatus: Patient does have flatus present.    Stool:  occurrences.    Characteristics:           Stool Assessment  Last BM (including prior to admit): 05/27/24    Emesis:  occurrences.    Characteristics:        VITAL SIGNS  Patient Vitals for the past 12 hrs:   Temp Pulse Resp BP SpO2   07/01/24 1510 97.7 °F (36.5 °C) (!) 110 16 (!) 132/93 100 %   07/01/24 1120 97.9 °F (36.6 °C) (!) 109 18 109/69 99 %   07/01/24 1059 -- -- 16 -- --   07/01/24 0719 98.1 °F (36.7 °C) (!) 108 17 116/78 99 %       Pain Assessment  Pain Level: 1 (07/01/24 1349)  Pain Location: Ankle  Patient's Stated Pain Goal: 5    Ambulating  Yes    Shift report given to oncoming nurse at the bedside.    Gaby Rankin RN

## 2024-07-01 NOTE — CONSULTS
[facility]  Inpatient Rehab Consult        Admission Date: 6/27/2024  Primary Care Provider: No primary care provider on file.  Specialty Group / Referring Service: Medicine      Chief Complaint : BLE weakness  Admitting Diagnosis:   Melena [K92.1]  CRISTIN (acute kidney injury) (HCC) [N17.9]  Acute kidney injury (HCC) [N17.9]  Acute urinary retention [R33.8]    Principal Problem:    CRISTIN (acute kidney injury) (HCC)  Active Problems:    Weight loss    Anemia    Urine retention    Cachexia (HCC)    Normocytic anemia    Hematuria    Moderate protein-calorie malnutrition (HCC)    Tinea pedis  Resolved Problems:    * No resolved hospital problems. *      Acute Rehab Diagnoses:  Encounter for rehabilitation [Z51.89]   Abnormality of gait and mobility [R26.9]  Decreased independence for activities of daily living (ADL) [Z78.9]  Physical debility / deconditioning [R53.81]      Medical Dx:No past medical history on file.    Date of Evaluation: July 1, 2024    Subjective       HPI: Carlos Cox is a 68 y.o. male patient who presented to [facility] on 6/27/2024 secondary to urinary retention. Per H&P \" Carlos Cox is a 68 y.o. male with medical history of  none, living alone, who is evaluated with abdominal pain/ decreased urination for 2 weeks.  Notices appetite has been poor as well.In the ER, Found with urine retention 4 L in bladder s/p nowak. Creatinine 6.52/ . Receiving IVF. Bicarb 16 , anion gap 21. Pt Not aware of any prostate issuesHad pain after nowak placed. Had some dark stools, but taking peptobismol. Denies NSAID use .HGB 9.9. Albumin 2.6 He is able to urinate but seems much less. He has no local family or friends/ his mother lives in [place] and would be his next of kin and first emergency contact. [place] [phone]. Can drive, Tried to get to the store and stocks up on food but is hard, and has lost weight.Disheveled and has skin changes to LE can't walkChokes with

## 2024-07-01 NOTE — PROGRESS NOTES
ACUTE PHYSICAL THERAPY GOALS:   (Developed with and agreed upon by patient and/or caregiver.)  LTG:  (1.)Mr. Cox  will move from supine to sit and sit to supine in flat bed without siderails with INDEPENDENT  within 7 day(s).    (2.)Mr. Cox  will transfer from bed to chair and chair to bed with  MODIFIED INDEPENDENCE  using the least restrictive/no device within 7 day(s).    (3.)Mr. Cox  will ambulate with  MODIFIED INDEPENDENCE  for 100+ feet with the least restrictive/no device within 7 day(s).   (4.)Mr. Cox  will ambulate up/down 1 steps with bilateral railing with  MODIFIED INDEPENDENCE with no device within  7  day(s).    PHYSICAL THERAPY: Daily Note PM   (Link to Caseload Tracking: PT Visit Days : 2  Time In/Out PT Charge Capture  Rehab Caseload Tracker  Orders    Carlos Cox is a 68 y.o. male   PRIMARY DIAGNOSIS: CRISTIN (acute kidney injury) ([facility])  Melena [K92.1]  CRISTIN (acute kidney injury) ([facility]) [N17.9]  Acute kidney injury (HCC) [N17.9]  Acute urinary retention [R33.8]       Inpatient: Payor: MEDICARE / Plan: MEDICARE PART A / Product Type: *No Product type* /     ASSESSMENT:     REHAB RECOMMENDATIONS:   Recommendation to date pending progress:  Setting:  Short-term Rehab    Equipment:    Rolling Walker     ASSESSMENT:  Mr. Cox was sitting in recliner and very agreeable for PT.  He performed ex's in sitting with multimodal cueing for correct execution.  Patient stood with min A and cueing to RW, his LE's began shaking with weakness.  He was then able to ambulate 50' very slowly with step to gait using RW with CG to min A.  Patient rested and then ambulated again.  Great progress today with transfers and ambulation!!!  Patient is very kind and cooperative.  Will continue to work towards goals..     SUBJECTIVE:   Mr. Cox states, \"I actually like to exercise.\"     Social/Functional Lives With: Alone  Type of Home: Apartment  Home Layout: One level  Home Access: Stairs to enter without

## 2024-07-01 NOTE — CONSULTS
Comprehensive Nutrition Assessment    Type and Reason for Visit: Reassess, Consult  Malnutrition Screening Tool: Malnutrition Screen  Have you recently lost weight without trying?: 14 to 23 pounds (2 points)  Have you been eating poorly because of a decreased appetite?: Yes (1 point)  Malnutrition Screening Tool Score: 3  6/28- consult for poor po (hospitalist)    Nutrition Recommendations/Plan:   Meals and Snacks:  Diet: Continue current order  Nutrition Supplement Therapy:  Medical food supplement therapy:  Continue Ensure Enlive three times per day (this provides 350 kcal and 20 grams protein per bottle)     Malnutrition Assessment:  Malnutrition Assessment:  Malnutrition Status: Moderate malnutrition  Context: Chronic Illness  Findings of clinical characteristics of malnutrition:   Energy Intake:  No significant decrease in energy intake  Weight Loss:  Unable to assess     Body Fat Loss:  Mild body fat loss Buccal region, Triceps, Fat Overlying Ribs   Muscle Mass Loss:  Mild muscle mass loss Hand (interosseous), Clavicles (pectoralis & deltoids)  Fluid Accumulation:  Unable to assess     Strength:  Not Performed     Nutrition Assessment:  Nutrition History: Unable to assess as pt is off the floor.      Do You Have Any Cultural, Restoration, or Ethnic Food Preferences?: No   Weight History: No wt hx per EMR. Current weight still with low BMI.   Nutrition Background:       No PMH. Pt admitted with CRISTIN. Per H&P pt with wounds.   Nutrition Interval:  Patient working with PT. Spoke with RN Tatiana who reports patient is eating everything on trays and ensures. Patient may eat slowly but eating very well.     Current Nutrition Therapies:  ADULT DIET; Easy to Chew  ADULT ORAL NUTRITION SUPPLEMENT; Breakfast, Lunch, Dinner; Standard High Calorie/High Protein Oral Supplement    Current Intake:   Average Meal Intake: % Average Supplements Intake: %      Anthropometric Measures:  Height: 168.9 cm (5'

## 2024-07-01 NOTE — PROGRESS NOTES
ACUTE OCCUPATIONAL THERAPY GOALS:   (Developed with and agreed upon by patient and/or caregiver.)  1. Pt will toilet with CGA   2. Pt will complete functional mobility for ADLs with CGA using AD as needed  3. Pt will complete lower body bathing and dressing with CGA using AE as needed  4. Pt will complete grooming and hygiene at sink with CGA  5. Pt will demonstrate independence with HEP to promote increased BUE strength and functional use for ADLs  6. Pt will tolerate 23 minutes functional activity with min or fewer rest breaks to promote increased endurance for ADLs  7. Pt will complete UB bathing and dressing with set up     Timeframe: 7 visits    OCCUPATIONAL THERAPY: Daily Note PM   OT Visit Days: 2   Time In/Out  OT Charge Capture  Rehab Caseload Tracker  OT Orders    Carlos Cox is a 68 y.o. male   PRIMARY DIAGNOSIS: CRISTIN (acute kidney injury) (HCC)  Melena [K92.1]  CRISTIN (acute kidney injury) (HCC) [N17.9]  Acute kidney injury (HCC) [N17.9]  Acute urinary retention [R33.8]       Inpatient: Payor: MEDICARE / Plan: MEDICARE PART A / Product Type: *No Product type* /     ASSESSMENT:     REHAB RECOMMENDATIONS:   Recommendation to date pending progress:  Setting:  Short-term Rehab    Equipment:    To Be Determined     ASSESSMENT:  Mr. Cox remains limited by generalized weakness and deficits in activity tolerance, mobility, and balance impacting ADLs. Pt required CGA for ADLs and for functional mobility using RW. Pt tolerated well, however slightly unsteady at times. Pt w/ decreased attention and safety awareness and required extra time and occasional cues for tasks. Pt is progressing towards goals, continue POC.        SUBJECTIVE:     Mr. Cox states, \"I'm doing much better today.\"     Social/Functional Lives With: Alone  Type of Home: Apartment  Home Layout: One level  Home Access: Stairs to enter without rails  Entrance Stairs - Number of Steps: 1  Home Equipment: None  Ambulation Assistance:

## 2024-07-02 ENCOUNTER — APPOINTMENT (OUTPATIENT)
Dept: CT IMAGING | Age: 69
End: 2024-07-02
Payer: MEDICARE

## 2024-07-02 ENCOUNTER — TELEPHONE (OUTPATIENT)
Dept: UROLOGY | Age: 69
End: 2024-07-02

## 2024-07-02 ENCOUNTER — APPOINTMENT (OUTPATIENT)
Dept: ULTRASOUND IMAGING | Age: 69
End: 2024-07-02
Payer: MEDICARE

## 2024-07-02 PROBLEM — R00.0 TACHYCARDIA: Status: ACTIVE | Noted: 2024-07-02

## 2024-07-02 PROBLEM — I82.409 DVT (DEEP VENOUS THROMBOSIS) (HCC): Status: ACTIVE | Noted: 2024-07-02

## 2024-07-02 LAB
ANION GAP SERPL CALC-SCNC: 7 MMOL/L (ref 9–18)
APTT PPP: 36.9 SEC (ref 23.3–37.4)
BACTERIA SPEC CULT: NORMAL
BACTERIA SPEC CULT: NORMAL
BUN SERPL-MCNC: 59 MG/DL (ref 8–23)
CALCIUM SERPL-MCNC: 7.8 MG/DL (ref 8.8–10.2)
CHLORIDE SERPL-SCNC: 105 MMOL/L (ref 98–107)
CO2 SERPL-SCNC: 26 MMOL/L (ref 20–28)
CREAT SERPL-MCNC: 2.1 MG/DL (ref 0.8–1.3)
D DIMER PPP FEU-MCNC: 15.7 UG/ML(FEU)
EKG ATRIAL RATE: 116 BPM
EKG DIAGNOSIS: NORMAL
EKG P AXIS: 76 DEGREES
EKG P-R INTERVAL: 136 MS
EKG Q-T INTERVAL: 320 MS
EKG QRS DURATION: 78 MS
EKG QTC CALCULATION (BAZETT): 444 MS
EKG R AXIS: 79 DEGREES
EKG T AXIS: 69 DEGREES
EKG VENTRICULAR RATE: 116 BPM
ERYTHROCYTE [DISTWIDTH] IN BLOOD BY AUTOMATED COUNT: 13.7 % (ref 11.9–14.6)
GLUCOSE SERPL-MCNC: 101 MG/DL (ref 70–99)
HCT VFR BLD AUTO: 24.4 % (ref 41.1–50.3)
HCT VFR BLD AUTO: 26.4 % (ref 41.1–50.3)
HGB BLD-MCNC: 7.9 G/DL (ref 13.6–17.2)
HGB BLD-MCNC: 8.7 G/DL (ref 13.6–17.2)
INR PPP: 1.1
MCH RBC QN AUTO: 31.3 PG (ref 26.1–32.9)
MCHC RBC AUTO-ENTMCNC: 33 G/DL (ref 31.4–35)
MCV RBC AUTO: 95 FL (ref 82–102)
NRBC # BLD: 0 K/UL (ref 0–0.2)
PHOSPHATE SERPL-MCNC: 1.9 MG/DL (ref 2.5–4.5)
PLATELET # BLD AUTO: 162 K/UL (ref 150–450)
PLATELET # BLD AUTO: 176 K/UL (ref 150–450)
PMV BLD AUTO: 9.5 FL (ref 9.4–12.3)
POTASSIUM SERPL-SCNC: 3.7 MMOL/L (ref 3.5–5.1)
PROTHROMBIN TIME: 14.6 SEC (ref 11.3–14.9)
RBC # BLD AUTO: 2.78 M/UL (ref 4.23–5.6)
SERVICE CMNT-IMP: NORMAL
SERVICE CMNT-IMP: NORMAL
SODIUM SERPL-SCNC: 139 MMOL/L (ref 136–145)
UFH PPP CHRO-ACNC: <0.1 IU/ML (ref 0.3–0.7)
WBC # BLD AUTO: 12.7 K/UL (ref 4.3–11.1)

## 2024-07-02 PROCEDURE — 71260 CT THORAX DX C+: CPT | Performed by: RADIOLOGY

## 2024-07-02 PROCEDURE — 36415 COLL VENOUS BLD VENIPUNCTURE: CPT

## 2024-07-02 PROCEDURE — 85520 HEPARIN ASSAY: CPT

## 2024-07-02 PROCEDURE — 85018 HEMOGLOBIN: CPT

## 2024-07-02 PROCEDURE — 6360000002 HC RX W HCPCS: Performed by: FAMILY MEDICINE

## 2024-07-02 PROCEDURE — 85610 PROTHROMBIN TIME: CPT

## 2024-07-02 PROCEDURE — 80048 BASIC METABOLIC PNL TOTAL CA: CPT

## 2024-07-02 PROCEDURE — 99231 SBSQ HOSP IP/OBS SF/LOW 25: CPT | Performed by: NURSE PRACTITIONER

## 2024-07-02 PROCEDURE — 2580000003 HC RX 258: Performed by: INTERNAL MEDICINE

## 2024-07-02 PROCEDURE — 6370000000 HC RX 637 (ALT 250 FOR IP): Performed by: INTERNAL MEDICINE

## 2024-07-02 PROCEDURE — 93005 ELECTROCARDIOGRAM TRACING: CPT | Performed by: FAMILY MEDICINE

## 2024-07-02 PROCEDURE — 71260 CT THORAX DX C+: CPT

## 2024-07-02 PROCEDURE — 93010 ELECTROCARDIOGRAM REPORT: CPT | Performed by: INTERNAL MEDICINE

## 2024-07-02 PROCEDURE — 6370000000 HC RX 637 (ALT 250 FOR IP): Performed by: NURSE PRACTITIONER

## 2024-07-02 PROCEDURE — 85379 FIBRIN DEGRADATION QUANT: CPT

## 2024-07-02 PROCEDURE — 85014 HEMATOCRIT: CPT

## 2024-07-02 PROCEDURE — 93970 EXTREMITY STUDY: CPT

## 2024-07-02 PROCEDURE — 1100000000 HC RM PRIVATE

## 2024-07-02 PROCEDURE — 85027 COMPLETE CBC AUTOMATED: CPT

## 2024-07-02 PROCEDURE — 6360000004 HC RX CONTRAST MEDICATION: Performed by: GENERAL PRACTICE

## 2024-07-02 PROCEDURE — 85730 THROMBOPLASTIN TIME PARTIAL: CPT

## 2024-07-02 PROCEDURE — 85049 AUTOMATED PLATELET COUNT: CPT

## 2024-07-02 PROCEDURE — 84100 ASSAY OF PHOSPHORUS: CPT

## 2024-07-02 PROCEDURE — 6370000000 HC RX 637 (ALT 250 FOR IP): Performed by: FAMILY MEDICINE

## 2024-07-02 RX ORDER — HEPARIN SODIUM 1000 [USP'U]/ML
80 INJECTION, SOLUTION INTRAVENOUS; SUBCUTANEOUS PRN
Status: DISCONTINUED | OUTPATIENT
Start: 2024-07-02 | End: 2024-07-04

## 2024-07-02 RX ORDER — MAGNESIUM HYDROXIDE/ALUMINUM HYDROXICE/SIMETHICONE 120; 1200; 1200 MG/30ML; MG/30ML; MG/30ML
30 SUSPENSION ORAL EVERY 6 HOURS PRN
Status: DISCONTINUED | OUTPATIENT
Start: 2024-07-02 | End: 2024-07-06 | Stop reason: HOSPADM

## 2024-07-02 RX ORDER — HEPARIN SODIUM 1000 [USP'U]/ML
40 INJECTION, SOLUTION INTRAVENOUS; SUBCUTANEOUS PRN
Status: DISCONTINUED | OUTPATIENT
Start: 2024-07-02 | End: 2024-07-04

## 2024-07-02 RX ORDER — HEPARIN SODIUM 10000 [USP'U]/100ML
5-30 INJECTION, SOLUTION INTRAVENOUS CONTINUOUS
Status: DISCONTINUED | OUTPATIENT
Start: 2024-07-02 | End: 2024-07-04

## 2024-07-02 RX ORDER — HEPARIN SODIUM 1000 [USP'U]/ML
80 INJECTION, SOLUTION INTRAVENOUS; SUBCUTANEOUS ONCE
Status: COMPLETED | OUTPATIENT
Start: 2024-07-02 | End: 2024-07-02

## 2024-07-02 RX ADMIN — SODIUM CHLORIDE: 9 INJECTION, SOLUTION INTRAVENOUS at 06:17

## 2024-07-02 RX ADMIN — HEPARIN SODIUM 3700 UNITS: 1000 INJECTION INTRAVENOUS; SUBCUTANEOUS at 19:09

## 2024-07-02 RX ADMIN — TAMSULOSIN HYDROCHLORIDE 0.4 MG: 0.4 CAPSULE ORAL at 08:07

## 2024-07-02 RX ADMIN — HEPARIN SODIUM 18 UNITS/KG/HR: 10000 INJECTION, SOLUTION INTRAVENOUS at 19:15

## 2024-07-02 RX ADMIN — SODIUM CHLORIDE, PRESERVATIVE FREE 10 ML: 5 INJECTION INTRAVENOUS at 08:11

## 2024-07-02 RX ADMIN — PANTOPRAZOLE SODIUM 40 MG: 40 TABLET, DELAYED RELEASE ORAL at 06:15

## 2024-07-02 RX ADMIN — ALUMINUM HYDROXIDE, MAGNESIUM HYDROXIDE, AND SIMETHICONE 30 ML: 1200; 120; 1200 SUSPENSION ORAL at 15:33

## 2024-07-02 RX ADMIN — IOPAMIDOL 100 ML: 755 INJECTION, SOLUTION INTRAVENOUS at 16:22

## 2024-07-02 RX ADMIN — FINASTERIDE 5 MG: 5 TABLET, FILM COATED ORAL at 08:07

## 2024-07-02 ASSESSMENT — PAIN SCALES - GENERAL
PAINLEVEL_OUTOF10: 0
PAINLEVEL_OUTOF10: 0

## 2024-07-02 NOTE — PROGRESS NOTES
END OF SHIFT NOTE:    INTAKE/OUTPUT  07/01 0701 - 07/02 0700  In: 4502.2 [I.V.:4502.2]  Out: 4275 [Urine:4275]  Voiding: No  Catheter: Yes  Drain:   Urinary Catheter 06/27/24 Coude;2 Way (Active)   $ Urethral catheter insertion $ Not inserted for procedure 06/29/24 2208   Catheter Indications Urinary retention (acute or chronic), continuous bladder irrigation or bladder outlet obstruction 07/01/24 1920   Site Assessment No urethral drainage 07/01/24 1920   Urine Color Yellow 07/02/24 0504   Urine Appearance Clear 07/02/24 0504   Urine Odor Fruity 07/02/24 0504   Collection Container Standard 07/01/24 1029   Securement Method Securing device (Describe) 07/01/24 1029   Catheter Care  Perineal wipes 07/01/24 2209   Catheter Best Practices  Drainage tube clipped to bed 07/01/24 2209   Status Draining;Patent 07/01/24 1029   Output (mL) 200 mL 07/02/24 0504   Discontinuation Reason Per provider order 07/01/24 1920               Flatus: Patient does have flatus present.    Stool:  0 occurrences.    Characteristics:           Stool Assessment  Last BM (including prior to admit): 05/27/24 (per patient \"several days ago, about a week\")    Emesis: 0 occurrences.    Characteristics:        VITAL SIGNS  Patient Vitals for the past 12 hrs:   Temp Pulse Resp BP SpO2   07/02/24 0330 98.4 °F (36.9 °C) (!) 114 16 109/61 98 %   07/01/24 2319 98.4 °F (36.9 °C) (!) 112 15 (!) 95/56 97 %   07/01/24 1920 98.4 °F (36.9 °C) (!) 116 26 130/81 99 %       Pain Assessment  Pain Level: 0 (07/02/24 0400)  Pain Location: Ankle  Patient's Stated Pain Goal: 5    Ambulating  No    Shift report given to oncoming nurse at the bedside.    Yen Martínez RN

## 2024-07-02 NOTE — PROGRESS NOTES
Carlos Cox  Admission Date: 6/27/2024         [facility] Progress Note: 7/2/2024    Follow-up for: CRISTIN    The patient's chart is reviewed and the patient is discussed with the staff.    Subjective:     Patient seen and examined in room.    ROS:  Gen - no fever, no chills, appetite unchanged  CV - no chest pain, no palpitation  Lung - no shortness of breath, no cough  Abd - no tenderness, no nausea/vomiting, no diarrhea  Ext - no edema    Current Facility-Administered Medications   Medication Dose Route Frequency    0.9 % sodium chloride infusion   IntraVENous Continuous    ketoconazole (NIZORAL) tablet 200 mg  200 mg Oral Once    pantoprazole (PROTONIX) tablet 40 mg  40 mg Oral QAM AC    finasteride (PROSCAR) tablet 5 mg  5 mg Oral Daily    HYDROcodone-acetaminophen 2.5-108 mg/5 mL solution 15 mL  15 mL Oral Q4H PRN    diatrizoate meglumine-sodium (GASTROGRAFIN) 66-10 % solution 15 mL  15 mL Oral ONCE PRN    iopamidol (ISOVUE-370) 76 % injection 100 mL  100 mL IntraVENous ONCE PRN    sodium chloride flush 0.9 % injection 5-40 mL  5-40 mL IntraVENous 2 times per day    sodium chloride flush 0.9 % injection 5-40 mL  5-40 mL IntraVENous PRN    0.9 % sodium chloride infusion   IntraVENous PRN    ondansetron (ZOFRAN-ODT) disintegrating tablet 4 mg  4 mg Oral Q8H PRN    Or    ondansetron (ZOFRAN) injection 4 mg  4 mg IntraVENous Q6H PRN    polyethylene glycol (GLYCOLAX) packet 17 g  17 g Oral Daily PRN    acetaminophen (TYLENOL) tablet 650 mg  650 mg Oral Q6H PRN    Or    acetaminophen (TYLENOL) suppository 650 mg  650 mg Rectal Q6H PRN    hyoscyamine (LEVSIN/SL) sublingual tablet 125 mcg  125 mcg SubLINGual Q4H PRN    tamsulosin (FLOMAX) capsule 0.4 mg  0.4 mg Oral Daily         Objective:     Vitals:    07/01/24 1920 07/01/24 2319 07/02/24 0330 07/02/24 0728   BP: 130/81 (!) 95/56 109/61 119/71   Pulse: (!) 116 (!) 112 (!) 114 (!) 106   Resp: 26 15 16 18   Temp: 98.4 °F (36.9 °C) 98.4 °F  (36.9 °C) 98.4 °F (36.9 °C) 98.6 °F (37 °C)   TempSrc: Oral Oral Oral Oral   SpO2: 99% 97% 98% 100%   Weight:       Height:         Intake and Output:   06/30 1901 - 07/02 0700  In: 6486.5 [I.V.:6486.5]  Out: 5625 [Urine:5625]  No intake/output data recorded.    Physical Exam:   Constitutional:  in no acute distress  HEENT:  Sclera clear, pupils equal, oral mucosa moist  Lungs: Clear  Cardiovascular:  RRR without M,G,R  Abd/GI: soft and non-tender; with positive bowel sounds.  Ext: warm without cyanosis.   Chronic skin changes     MRI LUMBAR SPINE W WO CONTRAST   Final Result      1.  Old compression fractures at T11 and L1 without significant retropulsion. No   acute compression fracture within the lumbar spine.      2.  Multilevel degenerative disc changes and spondylosis of the lumbar spine   with multilevel foraminal stenosis.      3.  Posterior paraspinous soft tissue edema as well as atrophy.      4.  No evidence of metastatic disease to the lumbar spine. No pathologic   compression fracture.      Electronically signed by RONAL BUSTILLOS      MRI PROSTATE W WO CONTRAST   Final Result   PI-RADS 3   Severe prostatomegaly and BPH. There is a focus of mild DWI signal in the left   posterior prostate. There is a subtle signal abnormality in this region on T2   images but no suspicious enhancement is identified. This is of intermediate   probability for malignancy. Follow-up exam could be performed in 6 months to   ensure stability      Colby catheter appears well-positioned in the bladder. Bladder wall thickening   and trabeculation likely secondary to chronic bladder outlet obstruction. Signal   abnormalities in the posterior bladder likely reflecting bladder debris or mild   hemorrhage.      Small volume ascites.            Electronically signed by [facility]   Final Result   Kidneys bilaterally show simple cyst and stones.   No hydronephrosis is seen on either side.   Increased

## 2024-07-02 NOTE — PROGRESS NOTES
Physical Therapy Note:    Attempted to see patient this PM for physical therapy treatment  session.  Treatment deferred as the patient is wanting pain medication for his stomach pain. Will follow and re-attempt as schedule permits/patient available. Thank you,    Kimberly Zambrano-Magdalene, [facility]     Rehab Caseload Tracker

## 2024-07-02 NOTE — PROGRESS NOTES
Hospitalist Progress Note   Admit Date:  2024  6:52 PM   Name:  Carlos Cox   Age:  68 y.o.  Sex:  male  :  1955   MRN:  [id number]   Room:  216/    Presenting/Chief Complaint: Fall and Rectal Bleeding     Reason(s) for Admission: Melena [K92.1]  CRISTIN (acute kidney injury) (HCC) [N17.9]  Acute kidney injury (HCC) [N17.9]  Acute urinary retention [R33.8]     Hospital Course:     Carlos Cox is a 68 y.o. male with medical history of  none, living alone, who is evaluated with abdominal pain/ decreased urination for 2 weeks.  Notices appetite has been poor as well.    In the ER, Found with urine retention 4 L in bladder s/p nowak. Creatinine 6.52/ . Receiving IVF. Bicarb 16 , anion gap 21. Pt Not aware of any prostate issues  Had pain after nowak placed. Had some dark stools, but taking peptobismol. Denies NSAID use .HGB 9.9. Albumin 2.6     He is able to urinate but seems much less. He has no local family or friends/ his mother lives in [place] and would be his next of kin and first emergency contact. [place] [phone]. Can drive, Tried to get to the store and stocks up on food but is hard, and has lost weight.     Disheveled and has skin changes to LE  Can't walk  Chokes with eating and feels as if something is stuck in mouth and wants to brush his teeth     Subjective & 24hr Events:   No acute events overnight. Carlos feeling better than prior baseline. Really has no complaints. Pain resolved. Nowak now minimal to no hematuria.    Slept: Well    Eating: Fair    Drinking: Fair    Stooling: Okay    Voiding: Okay    Pain: as above    ROS  Pertinent positives:  As outlined above    Pertinent negatives:  Chest Pain, Shortness of Breath, and Bloody or black stools, no focal weakness, saddle anesthesia, new urinary/stool incontinence/retention    Assessment & Plan:     Acute Kidney Injury   Assessment  Suspect postobstructive renal failure with large volume urinary retention resolved with Nowak

## 2024-07-02 NOTE — PROGRESS NOTES
Received call from Meseret in Ultrasound dept. Pt is positive for bilateral DVT. Provider paged through [facility] [employer] as urgent. Morenita Boston NP covering for Dr. Parmar.

## 2024-07-02 NOTE — PROGRESS NOTES
Admit Date: 6/27/2024      Subjective:     Carlos Cox is sitting in bed eating breakfast. No new complaints.    Objective:     Patient Vitals for the past 8 hrs:   BP Temp Temp src Pulse Resp SpO2   07/02/24 0728 119/71 98.6 °F (37 °C) Oral (!) 106 18 100 %   07/02/24 0330 109/61 98.4 °F (36.9 °C) Oral (!) 114 16 98 %     No intake/output data recorded.  06/30 1901 - 07/02 0700  In: 6486.5 [I.V.:6486.5]  Out: 5625 [Urine:5625]    Physical Exam:  GENERAL ASSESSMENT: alert, oriented to person, place and time, no acute distress and no anxiety, depression or agitation  Chest: normal work of breathing  CVS exam: normal rate, regular rhythm, normal S1, S2, no murmurs, rubs, clicks or gallops.  ABDOMEN: not done  Neurological exam reveals alert, oriented, normal speech, no focal findings or movement disorder noted.  FEMALE GENITOURINARY EXAM: not done  MALE GENITAL EXAM: not done    Data Review   Recent Results (from the past 24 hour(s))   Basic Metabolic Panel w/ Reflex to MG    Collection Time: 07/02/24  5:28 AM   Result Value Ref Range    Sodium 139 136 - 145 mmol/L    Potassium 3.7 3.5 - 5.1 mmol/L    Chloride 105 98 - 107 mmol/L    CO2 26 20 - 28 mmol/L    Anion Gap 7 (L) 9 - 18 mmol/L    Glucose 101 (H) 70 - 99 mg/dL    BUN 59 (H) 8 - 23 MG/DL    Creatinine 2.10 (H) 0.80 - 1.30 MG/DL    Est, Glom Filt Rate 33 (L) >60 ml/min/1.73m2    Calcium 7.8 (L) 8.8 - 10.2 MG/DL   Hemoglobin and Hematocrit    Collection Time: 07/02/24  5:28 AM   Result Value Ref Range    Hemoglobin 7.9 (L) 13.6 - 17.2 g/dL    Hematocrit 24.4 (L) 41.1 - 50.3 %   Platelet Count    Collection Time: 07/02/24  5:28 AM   Result Value Ref Range    Platelets 162 150 - 450 K/uL   Phosphorus    Collection Time: 07/02/24  5:28 AM   Result Value Ref Range    Phosphorus 1.9 (L) 2.5 - 4.5 MG/DL       Assessment:     Principal Problem:    CRISTIN (acute kidney injury) (HCC)  Active Problems:    Weight loss    Anemia    Urine retention    Cachexia (HCC)     Normocytic anemia    Hematuria    Moderate protein-calorie malnutrition (HCC)    Tinea pedis  Resolved Problems:    * No resolved hospital problems. *    Colby draining yellow UOP. Cr. 2.1. HGB 7.9. mild tachycardia.  MRI of prostate with Pi-RADS 3    MRI PROSTATE W WO CONTRAST  Order: [id number]  Status: Final result       Visible to patient: No (not released)       Next appt: None    0 Result Notes  Details    Reading Physician Reading Date Result Priority   Fernando Nielsen MD  [phone] 7/1/2024      Narrative & Impression  EXAMINATION: MRI PROSTATE W WO CONTRAST 6/30/2024 2:39 PM     ACCESSION NUMBER: [id number]     COMPARISON: None available     INDICATION: evaluate for  prostate cancer.     TECHNIQUE:  Multiplanar, multisequence MR imaging was performed of the prostate  prior to and following gadolinium contrast. mL Prohance contrast material was  administrated intravenously for the examination. This study was acquired  following the IV administration of contrast material, given the patient's  indications for the examination. If IV contrast material had not been  administered, the likely of detecting abnormalities relevant to the patient's  condition would have been substantially decreased.  All lesions assessed are  based on PI-RAD compliance.     FINDINGS:  Prostate Size: The prostate is markedly enlarged measuring 5.6 x 3.9 x 6.8 cm.  The prostate extends into the base of the bladder likely secondary to median  lobe hypertrophy.     BPH: Moderate BPH is noted     Hemorrhage: No evidence of prostate hemorrhage.     Prostate nodule: There is a focus of mild DWI signal in the mid prostate  posteriorly. This is not particularly hypointense on ADC images. This appears to  involve both the transition and peripheral zones. There is corresponding  slightly diminished T2 signal intensity in the posterior transition zone and  peripheral zone at this location which measures 1.3 cm. No suspicious  enhancement is

## 2024-07-03 LAB
25(OH)D3 SERPL-MCNC: 95.8 NG/ML (ref 30–100)
ANION GAP SERPL CALC-SCNC: 9 MMOL/L (ref 9–18)
BASOPHILS # BLD: 0 K/UL (ref 0–0.2)
BASOPHILS NFR BLD: 0 % (ref 0–2)
BUN SERPL-MCNC: 56 MG/DL (ref 8–23)
CALCIUM SERPL-MCNC: 7.7 MG/DL (ref 8.8–10.2)
CHLORIDE SERPL-SCNC: 105 MMOL/L (ref 98–107)
CO2 SERPL-SCNC: 25 MMOL/L (ref 20–28)
CREAT SERPL-MCNC: 1.87 MG/DL (ref 0.8–1.3)
DIFFERENTIAL METHOD BLD: ABNORMAL
EOSINOPHIL # BLD: 0.1 K/UL (ref 0–0.8)
EOSINOPHIL NFR BLD: 1 % (ref 0.5–7.8)
ERYTHROCYTE [DISTWIDTH] IN BLOOD BY AUTOMATED COUNT: 13.6 % (ref 11.9–14.6)
FUNGAL CULT/SMEAR: NORMAL
GLUCOSE SERPL-MCNC: 116 MG/DL (ref 70–99)
HCT VFR BLD AUTO: 21.9 % (ref 41.1–50.3)
HCT VFR BLD AUTO: 22.4 % (ref 41.1–50.3)
HCT VFR BLD AUTO: 23.5 % (ref 41.1–50.3)
HGB BLD-MCNC: 7.1 G/DL (ref 13.6–17.2)
HGB BLD-MCNC: 7.2 G/DL (ref 13.6–17.2)
HGB BLD-MCNC: 7.7 G/DL (ref 13.6–17.2)
IMM GRANULOCYTES # BLD AUTO: 0.1 K/UL (ref 0–0.5)
IMM GRANULOCYTES NFR BLD AUTO: 1 % (ref 0–5)
LYMPHOCYTES # BLD: 0.7 K/UL (ref 0.5–4.6)
LYMPHOCYTES NFR BLD: 6 % (ref 13–44)
MCH RBC QN AUTO: 30.8 PG (ref 26.1–32.9)
MCHC RBC AUTO-ENTMCNC: 32.8 G/DL (ref 31.4–35)
MCV RBC AUTO: 94 FL (ref 82–102)
MONOCYTES # BLD: 0.8 K/UL (ref 0.1–1.3)
MONOCYTES NFR BLD: 7 % (ref 4–12)
NEUTS SEG # BLD: 10.6 K/UL (ref 1.7–8.2)
NEUTS SEG NFR BLD: 85 % (ref 43–78)
NRBC # BLD: 0 K/UL (ref 0–0.2)
PLATELET # BLD AUTO: 168 K/UL (ref 150–450)
PMV BLD AUTO: 9.7 FL (ref 9.4–12.3)
POTASSIUM SERPL-SCNC: 3.7 MMOL/L (ref 3.5–5.1)
RBC # BLD AUTO: 2.5 M/UL (ref 4.23–5.6)
SODIUM SERPL-SCNC: 138 MMOL/L (ref 136–145)
SPECIMEN PROCESSING: NORMAL
SPECIMEN SOURCE: NORMAL
UFH PPP CHRO-ACNC: 0.38 IU/ML (ref 0.3–0.7)
UFH PPP CHRO-ACNC: 0.38 IU/ML (ref 0.3–0.7)
WBC # BLD AUTO: 12.4 K/UL (ref 4.3–11.1)

## 2024-07-03 PROCEDURE — 99222 1ST HOSP IP/OBS MODERATE 55: CPT | Performed by: PHYSICIAN ASSISTANT

## 2024-07-03 PROCEDURE — 36415 COLL VENOUS BLD VENIPUNCTURE: CPT

## 2024-07-03 PROCEDURE — 85018 HEMOGLOBIN: CPT

## 2024-07-03 PROCEDURE — 6370000000 HC RX 637 (ALT 250 FOR IP): Performed by: FAMILY MEDICINE

## 2024-07-03 PROCEDURE — 85520 HEPARIN ASSAY: CPT

## 2024-07-03 PROCEDURE — 6370000000 HC RX 637 (ALT 250 FOR IP): Performed by: NURSE PRACTITIONER

## 2024-07-03 PROCEDURE — 80048 BASIC METABOLIC PNL TOTAL CA: CPT

## 2024-07-03 PROCEDURE — 85025 COMPLETE CBC W/AUTO DIFF WBC: CPT

## 2024-07-03 PROCEDURE — 2580000003 HC RX 258: Performed by: INTERNAL MEDICINE

## 2024-07-03 PROCEDURE — 6370000000 HC RX 637 (ALT 250 FOR IP): Performed by: INTERNAL MEDICINE

## 2024-07-03 PROCEDURE — 1100000000 HC RM PRIVATE

## 2024-07-03 PROCEDURE — 97530 THERAPEUTIC ACTIVITIES: CPT

## 2024-07-03 PROCEDURE — 82306 VITAMIN D 25 HYDROXY: CPT

## 2024-07-03 PROCEDURE — 85014 HEMATOCRIT: CPT

## 2024-07-03 RX ADMIN — HYDROCODONE BITARTRATE AND ACETAMINOPHEN 15 ML: 7.5; 325 SOLUTION ORAL at 11:24

## 2024-07-03 RX ADMIN — POTASSIUM & SODIUM PHOSPHATES POWDER PACK 280-160-250 MG 250 MG: 280-160-250 PACK at 09:04

## 2024-07-03 RX ADMIN — SODIUM CHLORIDE: 9 INJECTION, SOLUTION INTRAVENOUS at 12:35

## 2024-07-03 RX ADMIN — TAMSULOSIN HYDROCHLORIDE 0.4 MG: 0.4 CAPSULE ORAL at 09:04

## 2024-07-03 RX ADMIN — FINASTERIDE 5 MG: 5 TABLET, FILM COATED ORAL at 09:04

## 2024-07-03 RX ADMIN — PANTOPRAZOLE SODIUM 40 MG: 40 TABLET, DELAYED RELEASE ORAL at 05:14

## 2024-07-03 ASSESSMENT — PAIN SCALES - GENERAL
PAINLEVEL_OUTOF10: 0
PAINLEVEL_OUTOF10: 2
PAINLEVEL_OUTOF10: 10
PAINLEVEL_OUTOF10: 0

## 2024-07-03 ASSESSMENT — PAIN DESCRIPTION - LOCATION: LOCATION: LEG

## 2024-07-03 ASSESSMENT — PAIN DESCRIPTION - ONSET: ONSET: PROGRESSIVE

## 2024-07-03 ASSESSMENT — PAIN DESCRIPTION - PAIN TYPE: TYPE: ACUTE PAIN

## 2024-07-03 ASSESSMENT — PAIN DESCRIPTION - DESCRIPTORS: DESCRIPTORS: ACHING;STABBING

## 2024-07-03 ASSESSMENT — PAIN DESCRIPTION - FREQUENCY: FREQUENCY: INTERMITTENT

## 2024-07-03 ASSESSMENT — PAIN DESCRIPTION - ORIENTATION: ORIENTATION: LEFT;RIGHT

## 2024-07-03 NOTE — PLAN OF CARE
Problem: Discharge Planning  Goal: Discharge to home or other facility with appropriate resources  7/3/2024 1940 by Nora Felix RN  Outcome: Progressing  7/3/2024 1207 by Gaby Rankin RN  Outcome: Progressing     Problem: Pain  Goal: Verbalizes/displays adequate comfort level or baseline comfort level  7/3/2024 1940 by Nora Felix RN  Outcome: Progressing  7/3/2024 1207 by Gaby Rankin RN  Outcome: Progressing     Problem: Skin/Tissue Integrity  Goal: Absence of new skin breakdown  Description: 1.  Monitor for areas of redness and/or skin breakdown  2.  Assess vascular access sites hourly  3.  Every 4-6 hours minimum:  Change oxygen saturation probe site  4.  Every 4-6 hours:  If on nasal continuous positive airway pressure, respiratory therapy assess nares and determine need for appliance change or resting period.  7/3/2024 1940 by Nora Felix RN  Outcome: Progressing  7/3/2024 1207 by Gaby Rankin RN  Outcome: Progressing     Problem: Safety - Adult  Goal: Free from fall injury  7/3/2024 1940 by Nora Felix, RN  Outcome: Progressing  7/3/2024 1207 by Gaby Rankin RN  Outcome: Progressing

## 2024-07-03 NOTE — PLAN OF CARE
Problem: Discharge Planning  Goal: Discharge to home or other facility with appropriate resources  7/3/2024 1207 by Gaby Rankin RN  Outcome: Progressing  7/2/2024 2307 by Nora Felix RN  Outcome: Progressing     Problem: Pain  Goal: Verbalizes/displays adequate comfort level or baseline comfort level  7/3/2024 1207 by Gaby Rankin RN  Outcome: Progressing  7/2/2024 2307 by Nora Felix RN  Outcome: Progressing     Problem: Skin/Tissue Integrity  Goal: Absence of new skin breakdown  Description: 1.  Monitor for areas of redness and/or skin breakdown  2.  Assess vascular access sites hourly  3.  Every 4-6 hours minimum:  Change oxygen saturation probe site  4.  Every 4-6 hours:  If on nasal continuous positive airway pressure, respiratory therapy assess nares and determine need for appliance change or resting period.  7/3/2024 1207 by Gaby Rankin, RN  Outcome: Progressing  7/2/2024 2307 by Nora Felix, RN  Outcome: Progressing     Problem: Safety - Adult  Goal: Free from fall injury  7/3/2024 1207 by Gaby Rankin, RN  Outcome: Progressing  7/2/2024 2307 by Nora Felix RN  Outcome: Progressing

## 2024-07-03 NOTE — CONSULTS
[facility]  Department of Interventional Radiology  [phone]        Consult Note     Patient: Carlos Cox MRN: [id number]  SSN: [id number]    YOB: 1955  Age: 68 y.o.  Sex: male      Referring Physician: Agata    Consult Date: 7/3/2024     Subjective:     Chief Complaint: DVT    History of Present Illness: Carlos Cox is a 68 y.o. male who is seen in consultation for evaluation of bilateral lower extremity DVT.  Patient presented to the emergency department with complaints of abdominal pain and decreased urination.  4 L of bloody urine was evacuated from his bladder.  He has no past medical history on file.  He was found to have acute kidney injury which is improving with hydration and Colby catheter.  No hydronephrosis on renal US.  He has bilateral lower extremity ulcers which he tells me have been present for a few weeks.  These were addressed by the wound care nurse.  Wraps below the knee have been applied.  Bilateral lower extremity venous ultrasound reveals DVT in the right femoral vein and left popliteal vein, unable to evaluate below the knee due to bandaging.  He is not aware of any history of DVT.  No PE on CT scan yesterday.  I questioned him about the laceration and bruising on his right cheek and he tells me that he has fallen a couple of times recently and one of them was severe but he does not recall the details.  He does ambulate, he denies lower extremity swelling.    He lives in some sort of long-term care facility with no family or friends in the area.    No past medical history on file.  No past surgical history on file.   No family history on file.  Social History     Tobacco Use    Smoking status: Never     Passive exposure: Never    Smokeless tobacco: Never   Substance Use Topics    Alcohol use: Not on file      No Known Allergies  Current Facility-Administered Medications   Medication Dose Route Frequency Provider Last Rate Last Admin        Or    acetaminophen (TYLENOL) suppository 650 mg  650 mg Rectal Q6H PRN Luna Kurtz MD        hyoscyamine (LEVSIN/SL) sublingual tablet 125 mcg  125 mcg SubLINGual Q4H PRN Luna Kurtz MD   125 mcg at 06/27/24 2356    tamsulosin (FLOMAX) capsule 0.4 mg  0.4 mg Oral Daily Luna Kurtz MD   0.4 mg at 07/02/24 0807         Prior to Admission medications    Not on File        No Known Allergies    Review of Systems:  Pertinent items are noted in HPI.    Objective:     Physical Exam:  Vitals:    07/02/24 1912 07/02/24 2339 07/03/24 0251 07/03/24 0731   BP: 110/63 (!) 108/59 112/61 113/63   Pulse: (!) 121 (!) 119 (!) 109 (!) 111   Resp: 18 16 18 17   Temp: 99 °F (37.2 °C) 99.5 °F (37.5 °C) 99.3 °F (37.4 °C) 98.2 °F (36.8 °C)   TempSrc: Oral Oral Oral Oral   SpO2: 98% 98% 97% 98%   Weight:       Height:            Pain Assessment:    0      Physical Exam:  Gen: disheveled, malodorous, cachectic  HEART: regular rate and rhythm  LUNG: diminished  ABDOMEN: nontender  EXTREMITIES: no edema observed, left leg is circumferentially larger than right .  Legs are wrapped below the knee    Imaging:    See HPI    Lab/Data Review:  CBC with Differential:    Lab Results   Component Value Date/Time    WBC 12.4 07/03/2024 01:19 AM    RBC 2.50 07/03/2024 01:19 AM    HGB 7.7 07/03/2024 01:19 AM    HCT 23.5 07/03/2024 01:19 AM     07/03/2024 01:19 AM    MCV 94.0 07/03/2024 01:19 AM    MCH 30.8 07/03/2024 01:19 AM    MCHC 32.8 07/03/2024 01:19 AM    RDW 13.6 07/03/2024 01:19 AM    NRBC 0.00 07/03/2024 01:19 AM    LYMPHOPCT 6 07/03/2024 01:19 AM    MONOPCT 7 07/03/2024 01:19 AM    EOSPCT 1 07/03/2024 01:19 AM    BASOPCT 0 07/03/2024 01:19 AM    MONOSABS 0.8 07/03/2024 01:19 AM    LYMPHSABS 0.7 07/03/2024 01:19 AM    EOSABS 0.1 07/03/2024 01:19 AM    BASOSABS 0.0 07/03/2024 01:19 AM    DIFFTYPE AUTOMATED 07/03/2024 01:19 AM     CMP:    Lab Results   Component Value Date/Time     07/03/2024 01:19 AM    K

## 2024-07-03 NOTE — PROGRESS NOTES
ACUTE PHYSICAL THERAPY GOALS:   (Developed with and agreed upon by patient and/or caregiver.)  LTG:  (1.)Mr. Cox  will move from supine to sit and sit to supine in flat bed without siderails with INDEPENDENT  within 7 day(s).    (2.)Mr. Cox  will transfer from bed to chair and chair to bed with  MODIFIED INDEPENDENCE  using the least restrictive/no device within 7 day(s).    (3.)Mr. Cox  will ambulate with  MODIFIED INDEPENDENCE  for 100+ feet with the least restrictive/no device within 7 day(s).   (4.)Mr. Cox  will ambulate up/down 1 steps with bilateral railing with  MODIFIED INDEPENDENCE with no device within  7  day(s).    PHYSICAL THERAPY: Daily Note AM   (Link to Caseload Tracking: PT Visit Days : 3  Time In/Out PT Charge Capture  Rehab Caseload Tracker  Orders    Carlos Cox is a 68 y.o. male   PRIMARY DIAGNOSIS: CRISTIN (acute kidney injury) ([facility])  Melena [K92.1]  CRISTIN (acute kidney injury) ([facility]) [N17.9]  Acute kidney injury (HCC) [N17.9]  Acute urinary retention [R33.8]       Inpatient: Payor: MEDICARE / Plan: MEDICARE PART A / Product Type: *No Product type* /     ASSESSMENT:     REHAB RECOMMENDATIONS:   Recommendation to date pending progress:  Setting:  Short-term Rehab    Equipment:    Rolling Walker     ASSESSMENT:  Mr. Cox is supine in bed  and very agreeable for PT.   Has been medicated by the RN.  Bed mobility is very very slow but he is able to complete independently.  Sitting balance good.  Gait training with rolling walker x 25 feet with very slow smiley with decreased step length and forward posture.  Patient is returned tot he room and to supine in bed, he is left positioned for comfort with needs within reach. Good session with limited  progress.  Encourage OOB activities.  Continue PT efforts.  Patient is very kind and cooperative.  Will continue to work towards goals. STR at d/c     SUBJECTIVE:   Mr. Cox states, \"I want to walk.\"     Social/Functional Lives With: Alone  Type of  S=Supervision, SBA=Standby Assistance, CGA=Contact Guard Assistance,   Min=Minimal Assistance, Mod=Moderate Assistance, Max=Maximal Assistance, Total=Total Assistance, NT=Not Tested    PLAN:   FREQUENCY AND DURATION: 3 times/week for duration of hospital stay or until stated goals are met, whichever comes first.    TREATMENT:   TREATMENT:   Therapeutic Activity (26 Minutes): Therapeutic activity included Transfer Training, Ambulation on level ground, Sitting balance , and Standing balance to improve functional Activity tolerance, Balance, Mobility, and Strength.  Therapeutic Exercise (  Minutes): Therapeutic exercises noted below to improve functional activity tolerance, strength, and mobility.     TREATMENT GRID:     DATE: 7/1/24       Ambulation        Hip Flexion 2x10 AB       Long Arc Quads 2x10 AB       Hip ab/ad 2x10 AB       Heel Raises X20 AB       Toe Raises X20 AB                                Key:  A=active, AA=active assisted, P=passive, B=bilaterally, R=right, L=left   DF=dorsiflexion, PF=plantarflexion      AFTER TREATMENT PRECAUTIONS: Bed, Bed/Chair Locked, Call light within reach, Needs within reach, and RN notified    INTERDISCIPLINARY COLLABORATION:  RN/ PCT and PT/ PTA    EDUCATION:  review POC and importance of mobility in the recovery process    TIME IN/OUT:  Time In: 1130  Time Out: 1156  Minutes: 26    Kimberly Yuan PTA

## 2024-07-03 NOTE — CARE COORDINATION
Chart reviewed by RNCM and discussed in IDR.    CM following for continued stay. Discharge plan remains for patient to discharge to [facility] when stable. Please consult case management if any additional discharge needs arise.

## 2024-07-03 NOTE — PROGRESS NOTES
1127 am Colby catheter with clear yellow urine noted. Colby clamped for voiding trail.     1337. Colby unclamped, patient never developed sensation to urinate. Colby bag with immediate urine return into bag after un-clamping and 300 ml of yellow urine observed.

## 2024-07-03 NOTE — PROGRESS NOTES
Carlos Cox  Admission Date: 6/27/2024         [facility] Progress Note: 7/3/2024    Follow-up for: CRISTIN    The patient's chart is reviewed and the patient is discussed with the staff.    Subjective:     Patient seen and examined in room.    ROS:  Gen - no fever, no chills, appetite unchanged  CV - no chest pain, no palpitation  Lung - no shortness of breath, no cough  Abd - no tenderness, no nausea/vomiting, no diarrhea  Ext - no edema    Current Facility-Administered Medications   Medication Dose Route Frequency    aluminum & magnesium hydroxide-simethicone (MAALOX) 200-200-20 MG/5ML suspension 30 mL  30 mL Oral Q6H PRN    heparin (porcine) injection 3,700 Units  80 Units/kg IntraVENous PRN    heparin (porcine) injection 1,800 Units  40 Units/kg IntraVENous PRN    heparin 25,000 units in dextrose 5% 250 mL (premix) infusion  5-30 Units/kg/hr IntraVENous Continuous    0.9 % sodium chloride infusion   IntraVENous Continuous    pantoprazole (PROTONIX) tablet 40 mg  40 mg Oral QAM AC    finasteride (PROSCAR) tablet 5 mg  5 mg Oral Daily    HYDROcodone-acetaminophen 2.5-108 mg/5 mL solution 15 mL  15 mL Oral Q4H PRN    diatrizoate meglumine-sodium (GASTROGRAFIN) 66-10 % solution 15 mL  15 mL Oral ONCE PRN    sodium chloride flush 0.9 % injection 5-40 mL  5-40 mL IntraVENous 2 times per day    sodium chloride flush 0.9 % injection 5-40 mL  5-40 mL IntraVENous PRN    0.9 % sodium chloride infusion   IntraVENous PRN    ondansetron (ZOFRAN-ODT) disintegrating tablet 4 mg  4 mg Oral Q8H PRN    Or    ondansetron (ZOFRAN) injection 4 mg  4 mg IntraVENous Q6H PRN    polyethylene glycol (GLYCOLAX) packet 17 g  17 g Oral Daily PRN    acetaminophen (TYLENOL) tablet 650 mg  650 mg Oral Q6H PRN    Or    acetaminophen (TYLENOL) suppository 650 mg  650 mg Rectal Q6H PRN    hyoscyamine (LEVSIN/SL) sublingual tablet 125 mcg  125 mcg SubLINGual Q4H PRN    tamsulosin (FLOMAX) capsule 0.4 mg  0.4 mg Oral

## 2024-07-03 NOTE — PROGRESS NOTES
END OF SHIFT NOTE:    INTAKE/OUTPUT  07/02 0701 - 07/03 0700  In: 592 [P.O.:592]  Out: 2750 [Urine:2750]  Voiding: No  Catheter: Yes  Drain:   Urinary Catheter 06/27/24 Coude;2 [address] (Active)   $ Urethral catheter insertion $ Not inserted for procedure 06/29/24 2208   Catheter Indications Urinary retention (acute or chronic), continuous bladder irrigation or bladder outlet obstruction 07/03/24 0907   Site Assessment No urethral drainage 07/03/24 0907   Urine Color Yellow 07/03/24 1128   Urine Appearance Clear 07/03/24 0907   Urine Odor Fruity 07/02/24 2000   Collection Container Standard 07/03/24 0907   Securement Method Securing device (Describe) 07/03/24 0907   Catheter Care  Perineal wipes 07/03/24 1810   Catheter Best Practices  Drainage tube clipped to bed 07/03/24 0907   Status Draining;Patent 07/03/24 1128   Output (mL) 325 mL 07/03/24 1504   Discontinuation Reason Per provider order 07/01/24 1920               Flatus: Patient does have flatus present.    Stool:  occurrences.    Characteristics:           Stool Assessment  Last BM (including prior to admit): 07/03/24    Emesis:  occurrences.    Characteristics:        VITAL SIGNS  Patient Vitals for the past 12 hrs:   Temp Pulse Resp BP SpO2   07/03/24 1504 98.5 °F (36.9 °C) (!) 110 16 126/76 98 %   07/03/24 1154 -- -- 17 -- --   07/03/24 1128 98.4 °F (36.9 °C) 71 16 129/84 100 %   07/03/24 0731 98.2 °F (36.8 °C) (!) 111 17 113/63 98 %       Pain Assessment  Pain Level: 2 (07/03/24 1154)  Pain Location: Leg  Patient's Stated Pain Goal: 4    Ambulating  Yes    Shift report given to oncoming nurse at the bedside.    Gaby Rankin RN

## 2024-07-04 LAB
ANION GAP SERPL CALC-SCNC: 8 MMOL/L (ref 9–18)
BASOPHILS # BLD: 0 K/UL (ref 0–0.2)
BASOPHILS NFR BLD: 0 % (ref 0–2)
BUN SERPL-MCNC: 44 MG/DL (ref 8–23)
CALCIUM SERPL-MCNC: 7.3 MG/DL (ref 8.8–10.2)
CHLORIDE SERPL-SCNC: 104 MMOL/L (ref 98–107)
CO2 SERPL-SCNC: 24 MMOL/L (ref 20–28)
CREAT SERPL-MCNC: 1.8 MG/DL (ref 0.8–1.3)
DIFFERENTIAL METHOD BLD: ABNORMAL
EOSINOPHIL # BLD: 0.2 K/UL (ref 0–0.8)
EOSINOPHIL NFR BLD: 1 % (ref 0.5–7.8)
ERYTHROCYTE [DISTWIDTH] IN BLOOD BY AUTOMATED COUNT: 13.9 % (ref 11.9–14.6)
FUNGUS SMEAR: NORMAL
GLUCOSE SERPL-MCNC: 93 MG/DL (ref 70–99)
HCT VFR BLD AUTO: 22.9 % (ref 41.1–50.3)
HCT VFR BLD AUTO: 24.3 % (ref 41.1–50.3)
HGB BLD-MCNC: 7.5 G/DL (ref 13.6–17.2)
HGB BLD-MCNC: 7.8 G/DL (ref 13.6–17.2)
IMM GRANULOCYTES # BLD AUTO: 0 K/UL (ref 0–0.5)
IMM GRANULOCYTES NFR BLD AUTO: 0 % (ref 0–5)
LYMPHOCYTES # BLD: 0.9 K/UL (ref 0.5–4.6)
LYMPHOCYTES NFR BLD: 9 % (ref 13–44)
MCH RBC QN AUTO: 31.3 PG (ref 26.1–32.9)
MCHC RBC AUTO-ENTMCNC: 32.8 G/DL (ref 31.4–35)
MCV RBC AUTO: 95.4 FL (ref 82–102)
MONOCYTES # BLD: 1 K/UL (ref 0.1–1.3)
MONOCYTES NFR BLD: 10 % (ref 4–12)
NEUTS SEG # BLD: 8.3 K/UL (ref 1.7–8.2)
NEUTS SEG NFR BLD: 80 % (ref 43–78)
NRBC # BLD: 0 K/UL (ref 0–0.2)
PHOSPHATE SERPL-MCNC: 1.7 MG/DL (ref 2.5–4.5)
PLATELET # BLD AUTO: 189 K/UL (ref 150–450)
PMV BLD AUTO: 9.5 FL (ref 9.4–12.3)
POTASSIUM SERPL-SCNC: 4.3 MMOL/L (ref 3.5–5.1)
RBC # BLD AUTO: 2.4 M/UL (ref 4.23–5.6)
SODIUM SERPL-SCNC: 137 MMOL/L (ref 136–145)
SPECIMEN SOURCE: NORMAL
UFH PPP CHRO-ACNC: 0.16 IU/ML (ref 0.3–0.7)
UFH PPP CHRO-ACNC: 0.39 IU/ML (ref 0.3–0.7)
WBC # BLD AUTO: 10.5 K/UL (ref 4.3–11.1)

## 2024-07-04 PROCEDURE — 85520 HEPARIN ASSAY: CPT

## 2024-07-04 PROCEDURE — 6370000000 HC RX 637 (ALT 250 FOR IP): Performed by: INTERNAL MEDICINE

## 2024-07-04 PROCEDURE — 36415 COLL VENOUS BLD VENIPUNCTURE: CPT

## 2024-07-04 PROCEDURE — 6360000002 HC RX W HCPCS: Performed by: FAMILY MEDICINE

## 2024-07-04 PROCEDURE — 6370000000 HC RX 637 (ALT 250 FOR IP): Performed by: NURSE PRACTITIONER

## 2024-07-04 PROCEDURE — 6370000000 HC RX 637 (ALT 250 FOR IP): Performed by: FAMILY MEDICINE

## 2024-07-04 PROCEDURE — 85025 COMPLETE CBC W/AUTO DIFF WBC: CPT

## 2024-07-04 PROCEDURE — 2580000003 HC RX 258: Performed by: INTERNAL MEDICINE

## 2024-07-04 PROCEDURE — 84100 ASSAY OF PHOSPHORUS: CPT

## 2024-07-04 PROCEDURE — 85014 HEMATOCRIT: CPT

## 2024-07-04 PROCEDURE — 1100000000 HC RM PRIVATE

## 2024-07-04 PROCEDURE — 80048 BASIC METABOLIC PNL TOTAL CA: CPT

## 2024-07-04 PROCEDURE — 85018 HEMOGLOBIN: CPT

## 2024-07-04 RX ORDER — LOPERAMIDE HYDROCHLORIDE 2 MG/1
4 CAPSULE ORAL 4 TIMES DAILY PRN
Status: DISCONTINUED | OUTPATIENT
Start: 2024-07-04 | End: 2024-07-06 | Stop reason: HOSPADM

## 2024-07-04 RX ORDER — SODIUM CHLORIDE, SODIUM LACTATE, POTASSIUM CHLORIDE, AND CALCIUM CHLORIDE .6; .31; .03; .02 G/100ML; G/100ML; G/100ML; G/100ML
500 INJECTION, SOLUTION INTRAVENOUS ONCE
Status: COMPLETED | OUTPATIENT
Start: 2024-07-04 | End: 2024-07-04

## 2024-07-04 RX ADMIN — POTASSIUM & SODIUM PHOSPHATES POWDER PACK 280-160-250 MG 250 MG: 280-160-250 PACK at 14:45

## 2024-07-04 RX ADMIN — METOPROLOL TARTRATE 25 MG: 25 TABLET, FILM COATED ORAL at 13:03

## 2024-07-04 RX ADMIN — POTASSIUM & SODIUM PHOSPHATES POWDER PACK 280-160-250 MG 250 MG: 280-160-250 PACK at 21:48

## 2024-07-04 RX ADMIN — POTASSIUM & SODIUM PHOSPHATES POWDER PACK 280-160-250 MG 250 MG: 280-160-250 PACK at 08:47

## 2024-07-04 RX ADMIN — ALUMINUM HYDROXIDE, MAGNESIUM HYDROXIDE, AND SIMETHICONE 30 ML: 1200; 120; 1200 SUSPENSION ORAL at 21:51

## 2024-07-04 RX ADMIN — METOPROLOL TARTRATE 25 MG: 25 TABLET, FILM COATED ORAL at 23:00

## 2024-07-04 RX ADMIN — HYDROCODONE BITARTRATE AND ACETAMINOPHEN 15 ML: 7.5; 325 SOLUTION ORAL at 11:28

## 2024-07-04 RX ADMIN — SODIUM CHLORIDE, PRESERVATIVE FREE 10 ML: 5 INJECTION INTRAVENOUS at 22:01

## 2024-07-04 RX ADMIN — SODIUM CHLORIDE, PRESERVATIVE FREE 10 ML: 5 INJECTION INTRAVENOUS at 08:51

## 2024-07-04 RX ADMIN — HYDROCODONE BITARTRATE AND ACETAMINOPHEN 15 ML: 7.5; 325 SOLUTION ORAL at 05:34

## 2024-07-04 RX ADMIN — FINASTERIDE 5 MG: 5 TABLET, FILM COATED ORAL at 08:47

## 2024-07-04 RX ADMIN — APIXABAN 5 MG: 5 TABLET, FILM COATED ORAL at 13:03

## 2024-07-04 RX ADMIN — APIXABAN 5 MG: 5 TABLET, FILM COATED ORAL at 21:57

## 2024-07-04 RX ADMIN — HEPARIN SODIUM 1800 UNITS: 1000 INJECTION INTRAVENOUS; SUBCUTANEOUS at 05:44

## 2024-07-04 RX ADMIN — HEPARIN SODIUM 18 UNITS/KG/HR: 10000 INJECTION, SOLUTION INTRAVENOUS at 02:34

## 2024-07-04 RX ADMIN — PANTOPRAZOLE SODIUM 40 MG: 40 TABLET, DELAYED RELEASE ORAL at 05:34

## 2024-07-04 RX ADMIN — TAMSULOSIN HYDROCHLORIDE 0.4 MG: 0.4 CAPSULE ORAL at 08:47

## 2024-07-04 RX ADMIN — SODIUM CHLORIDE, POTASSIUM CHLORIDE, SODIUM LACTATE AND CALCIUM CHLORIDE 500 ML: 600; 310; 30; 20 INJECTION, SOLUTION INTRAVENOUS at 08:53

## 2024-07-04 ASSESSMENT — PAIN SCALES - GENERAL
PAINLEVEL_OUTOF10: 10
PAINLEVEL_OUTOF10: 0
PAINLEVEL_OUTOF10: 7
PAINLEVEL_OUTOF10: 6
PAINLEVEL_OUTOF10: 3
PAINLEVEL_OUTOF10: 9

## 2024-07-04 ASSESSMENT — PAIN DESCRIPTION - LOCATION
LOCATION: ABDOMEN
LOCATION: ABDOMEN
LOCATION: GENERALIZED

## 2024-07-04 ASSESSMENT — PAIN - FUNCTIONAL ASSESSMENT
PAIN_FUNCTIONAL_ASSESSMENT: PREVENTS OR INTERFERES SOME ACTIVE ACTIVITIES AND ADLS
PAIN_FUNCTIONAL_ASSESSMENT: ACTIVITIES ARE NOT PREVENTED

## 2024-07-04 ASSESSMENT — PAIN DESCRIPTION - DESCRIPTORS
DESCRIPTORS: ACHING;CRAMPING
DESCRIPTORS: ACHING
DESCRIPTORS: ACHING

## 2024-07-04 ASSESSMENT — PAIN DESCRIPTION - ORIENTATION
ORIENTATION: MID
ORIENTATION: MID

## 2024-07-04 ASSESSMENT — PAIN DESCRIPTION - PAIN TYPE: TYPE: ACUTE PAIN

## 2024-07-04 ASSESSMENT — PAIN DESCRIPTION - FREQUENCY: FREQUENCY: INTERMITTENT

## 2024-07-04 NOTE — PROGRESS NOTES
Notified Dr. Brower via perfect serve patient's BP ranging from 89/58- 92/62, pulse= 102. Orders received for 500 cc LR bolus and encourage po fluids.

## 2024-07-04 NOTE — PROGRESS NOTES
urinary retention resolved with Colby insertion  Continues to improve as expected with IVF and post obstruction decompression  Creatinine on admission: 6.5 to  Creatinine today: 1.87  07/03/24-improving  Plan  Nephrology consulted-recommendations as below  Strict I+Os, daily weights, avoid nephrotoxic medications, renally dose medications, monitor lytes  Maintain BP   Stop IVF and encourage oral intake  Urology recommending maintaining Colby  Continue Flomax 0.4 mg daily  Continue finasteride  Mgmt  Fluids half-normal saline at 100 mL an hour, adjusted to 75 ml/hr x 24 additional hours  Renal US: No hydronephrosis, increased echogenicity suggesting underlying medical renal disease  AM BMP    DVT  Acute, complicated by gross hematuria now resolving  Trial heparin gtt, continue another 24 hours at high risk for bleeding with serial H&H per orders  S/p IR consult and thrombectomy    Tachycardia  2/2 ABLA + DVT, sinus tachycardia by EKG  Hematuria clearing up, but bilateral LE DVT's as above  Subjectively asymptomatic and markedly improved    Prostatomegaly  Concerning for possible prostate cancer  PSA 27.6  Urology consulted and assisting with management  Continue Flomax as above, finasteride by urology  Obtain MRI of the prostate as recommended by urology, add MRI of the lumbar spine as well  Continue Colby insertion and management as above for now    Hematuria  07/03/24-resolved  Prior to Colby insertion, needs prostate cancer rule out (outpatient with urology)  Hematuria now entirely resolved  Urology consulted, recommending maintain Colby catheter  As above, monitor cautiously on heparin gtt. may not end up being a candidate for DOAC  Continue to follow recommendations    Normocytic anemia  No prior baseline for comparison  Hemoglobin on arrival: 9.9  Hemoglobin today: 7.7, recheck 7.2, continue q6h H&H  Unclear H&H stability  No bleeding on ROS or exam (except prior resolved hematuria)  Complete nutritional  Problems:    Weight loss    Anemia    Urine retention    Cachexia (HCC)    Normocytic anemia    Hematuria    Moderate protein-calorie malnutrition (HCC)    Tinea pedis    DVT (deep venous thrombosis) (HCC)    Tachycardia  Resolved Problems:    * No resolved hospital problems. *      Objective:   Patient Vitals for the past 24 hrs:   Temp Pulse Resp BP SpO2   07/03/24 1955 98.4 °F (36.9 °C) (!) 113 18 115/69 98 %   07/03/24 1504 98.5 °F (36.9 °C) (!) 110 16 126/76 98 %   07/03/24 1154 -- -- 17 -- --   07/03/24 1128 98.4 °F (36.9 °C) 71 16 129/84 100 %   07/03/24 0731 98.2 °F (36.8 °C) (!) 111 17 113/63 98 %   07/03/24 0251 99.3 °F (37.4 °C) (!) 109 18 112/61 97 %   07/02/24 2339 99.5 °F (37.5 °C) (!) 119 16 (!) 108/59 98 %       Oxygen Therapy  SpO2: 98 %  O2 Device: None (Room air)    Estimated body mass index is 16.05 kg/m² as calculated from the following:    Height as of this encounter: 1.689 m (5' 6.5\").    Weight as of this encounter: 45.8 kg (100 lb 15.5 oz).    Intake/Output Summary (Last 24 hours) at 7/3/2024 2011  Last data filed at 7/3/2024 1504  Gross per 24 hour   Intake 120 ml   Output 2150 ml   Net -2030 ml         Physical Exam:     General:    Chronically ill-appearing.  Frail.  Cachectic.  Pleasant.  No distress.  Nontoxic.  No dyspnea.  Head:  Normocephalic, atraumatic  Eyes:  Sclerae appear normal.  Pupils equally round.  ENT:  Nares appear normal.  Moist oral mucosa  Neck:  No restricted ROM.  Trachea midline   CV:   RRR.  No m/r/g.  No jugular venous distension.  Lungs:   CTAB.  No wheezing, rhonchi, or rales.  Symmetric expansion.  Abdomen:   Soft, nontender, nondistended.  Extremities: No cyanosis or clubbing.  No edema. Severe fungal foot disease.  Skin:     No rashes.  Normal coloration.   Warm and dry.    Neuro:  CN II-XII grossly intact.  Generalized weakness without focal weakness.  Strength 4/5 throughout.  +1 patellar reflexes bilaterally however with bilateral clonus.  Sensation intact

## 2024-07-04 NOTE — PROGRESS NOTES
Physical Therapy Note:    Attempted to see patient this PM for physical therapy treatment  session. Patient reports his stomach is gurgling and he wants to finish eating lunch, politely declined PT. Will follow and re-attempt as schedule permits/patient available. Thank you,    CASSY Rankin, PT     Rehab Caseload Tracker

## 2024-07-04 NOTE — PROGRESS NOTES
END OF SHIFT NOTE:    INTAKE/OUTPUT  07/03 0701 - 07/04 0700  In: 2816.7 [P.O.:120; I.V.:2696.7]  Out: 2000 [Urine:2000]  Voiding: No  Catheter: Yes  Drain:   Urinary Catheter 06/27/24 Coude;2 [address] (Active)   $ Urethral catheter insertion $ Not inserted for procedure 06/29/24 2208   Catheter Indications Urinary retention (acute or chronic), continuous bladder irrigation or bladder outlet obstruction 07/04/24 0832   Site Assessment No urethral drainage 07/04/24 0832   Urine Color Yellow 07/04/24 0832   Urine Appearance Clear 07/04/24 0832   Urine Odor Fruity 07/03/24 1936   Collection Container Standard 07/04/24 0832   Securement Method Securing device (Describe) 07/04/24 0832   Catheter Care  Perineal wipes 07/04/24 0954   Catheter Best Practices  Catheter secured to thigh;Bag below bladder;Bag not on floor;Lack of dependent loop in tubing;Drainage bag less than half full 07/04/24 0832   Status Draining 07/04/24 0832   Output (mL) 325 mL 07/04/24 1124   Discontinuation Reason Per provider order 07/01/24 1920       Flatus: Patient does have flatus present.    Stool: 3 occurrences.    Characteristics:  Stool Appearance: Loose  Stool Color: Brown  Stool Amount: Large  Stool Assessment  Incontinence: Yes  Stool Appearance: Loose  Stool Color: Brown  Stool Amount: Large  Stool Source: Rectum  Last BM (including prior to admit): 07/04/24    Emesis: 0  occurrences.    Characteristics:        VITAL SIGNS  Patient Vitals for the past 12 hrs:   Temp Pulse Resp BP SpO2   07/04/24 1524 98.6 °F (37 °C) (!) 102 17 121/74 99 %   07/04/24 1300 -- (!) 119 -- 120/72 --   07/04/24 1130 -- (!) 115 -- -- --   07/04/24 1124 98.1 °F (36.7 °C) (!) 123 18 109/74 99 %   07/04/24 0736 -- (!) 102 -- 92/62 --   07/04/24 0730 98.2 °F (36.8 °C) (!) 101 16 (!) 89/58 95 %       Pain Assessment  Pain Level: 6 (07/04/24 1228)  Pain Location: Abdomen  Patient's Stated Pain Goal: 0 - No pain    Ambulating  No    Shift report given to oncoming nurse at  the bedside.    Traci Villegas RN      no

## 2024-07-04 NOTE — PROGRESS NOTES
Hospitalist Progress Note   Admit Date:  2024  6:52 PM   Name:  Carlos Cox   Age:  68 y.o.  Sex:  male  :  1955   MRN:  [id number]   Room:  216/    Presenting/Chief Complaint: Fall and Rectal Bleeding     Reason(s) for Admission: Melena [K92.1]  CRISTIN (acute kidney injury) (HCC) [N17.9]  Acute kidney injury (HCC) [N17.9]  Acute urinary retention [R33.8]     Hospital Course:     Carlos Cox is a 68 y.o. male with medical history of  none, living alone, who is evaluated with abdominal pain/ decreased urination for 2 weeks.  Notices appetite has been poor as well.    In the ER, Found with urine retention 4 L in bladder s/p nowak. Creatinine 6.52/ . Receiving IVF. Bicarb 16 , anion gap 21. Pt Not aware of any prostate issues  Had pain after nowak placed. Had some dark stools, but taking peptobismol. Denies NSAID use .HGB 9.9. Albumin 2.6     He is able to urinate but seems much less. He has no local family or friends/ his mother lives in [place] and would be his next of kin and first emergency contact. [employer] [phone]. Can drive, Tried to get to the store and stocks up on food but is hard, and has lost weight.     Disheveled and has skin changes to LE  Can't walk  Chokes with eating and feels as if something is stuck in mouth and wants to brush his teeth     Subjective & 24hr Events:   Feeling well today.  No hematuria.  No CP, SOB, or other complaints.  Still weak.    Awaiting SNF approval    Assessment & Plan:     Acute Kidney Injury   stable now.   Maintain nowak per urology  Daily BMP    DVT  Oral AC per IR.  No intervention  Start eliquis today and monitor.  No loading dose due to recent hematuria with significant blood loss.  If any bleeding, stop AC and consult IR for IVCF    Tachycardia  Start metoprolol and monitor    Prostatomegaly  Concerning for possible prostate cancer  PSA 27.6  Continue Nowak  Urology follow up outpt    Hematuria  resolved    Normocytic anemia  Hb stable.      Anti-XA Unfrac Heparin 0.16 (L) 0.3 - 0.7 IU/mL   Phosphorus    Collection Time: 07/04/24  5:08 AM   Result Value Ref Range    Phosphorus 1.7 (L) 2.5 - 4.5 MG/DL   Hemoglobin and Hematocrit    Collection Time: 07/04/24 11:57 AM   Result Value Ref Range    Hemoglobin 7.8 (L) 13.6 - 17.2 g/dL    Hematocrit 24.3 (L) 41.1 - 50.3 %       No results for input(s): \"COVID19\" in the last 72 hours.    Current Meds:  Current Facility-Administered Medications   Medication Dose Route Frequency    apixaban (ELIQUIS) tablet 10 mg  10 mg Oral BID    Followed by    [START ON 7/11/2024] apixaban (ELIQUIS) tablet 5 mg  5 mg Oral BID    potassium & sodium phosphates (PHOS-NAK) 280-160-250 MG packet 250 mg  1 packet Oral TID    aluminum & magnesium hydroxide-simethicone (MAALOX) 200-200-20 MG/5ML suspension 30 mL  30 mL Oral Q6H PRN    pantoprazole (PROTONIX) tablet 40 mg  40 mg Oral QAM AC    finasteride (PROSCAR) tablet 5 mg  5 mg Oral Daily    HYDROcodone-acetaminophen 2.5-108 mg/5 mL solution 15 mL  15 mL Oral Q4H PRN    diatrizoate meglumine-sodium (GASTROGRAFIN) 66-10 % solution 15 mL  15 mL Oral ONCE PRN    sodium chloride flush 0.9 % injection 5-40 mL  5-40 mL IntraVENous 2 times per day    sodium chloride flush 0.9 % injection 5-40 mL  5-40 mL IntraVENous PRN    0.9 % sodium chloride infusion   IntraVENous PRN    ondansetron (ZOFRAN-ODT) disintegrating tablet 4 mg  4 mg Oral Q8H PRN    Or    ondansetron (ZOFRAN) injection 4 mg  4 mg IntraVENous Q6H PRN    polyethylene glycol (GLYCOLAX) packet 17 g  17 g Oral Daily PRN    acetaminophen (TYLENOL) tablet 650 mg  650 mg Oral Q6H PRN    Or    acetaminophen (TYLENOL) suppository 650 mg  650 mg Rectal Q6H PRN    hyoscyamine (LEVSIN/SL) sublingual tablet 125 mcg  125 mcg SubLINGual Q4H PRN    tamsulosin (FLOMAX) capsule 0.4 mg  0.4 mg Oral Daily       Signed:  Tan Brower MD    Part of this note may have been written by using a voice dictation software.  The note has been

## 2024-07-04 NOTE — PROGRESS NOTES
Physician Progress Note      PATIENT:               TAMMI DUNLAP  [facility] #:                  [id number]  :                       1955  ADMIT DATE:       2024 6:52 PM  DISCH DATE:  RESPONDING  PROVIDER #:        Abiodun Nagel DO          QUERY TEXT:    Patient admitted with CRISTIN. Per wound care note, patient noted to also have   pressure ulcer. If possible, please document in progress notes and discharge   summary the location, present on admission status and stage of the pressure   ulcer:    The medical record reflects the following:  Risk Factors: 68 y.o. male with frailty and cachexia, falls, BMI 16.05  Clinical Indicators:  Wound care note \"Sacral area with shallow open areas   in pear shaped redness, stage 2 pressure injury, present on admission.\"  Treatment: Wound care consult, pink foam dressing change every other day.    Offload and turn frequently.    Stage 1:  Non-blanchable erythema of intact skin  Stage 2:  Abrasion, Blister, Partial-thickness skin loss, with exposed dermis  Stage 3:  Full-thickness skin loss with damage or necrosis of subcutaneous   tissue  Stage 4:  Full-thickness skin & soft tissue loss through to underlying muscle,   tendon or bone  Unstageable: Obscured full-thickness skin & tissue loss    Thank you,  Yasemin Macedo RN, BSN, CDI  [email or web address]  .  Options provided:  -- Stage 2 Pressure Ulcer of sacrum present on admission  -- Other - I will add my own diagnosis  -- Disagree - Not applicable / Not valid  -- Disagree - Clinically unable to determine / Unknown  -- Refer to Clinical Documentation Reviewer    PROVIDER RESPONSE TEXT:    This patient has a Stage 2 pressure ulcer of the sacrum which was present on   admission.    Query created by: Yasemin Macedo on 7/3/2024 9:05 AM      Electronically signed by:  Abiodun Nagel DO 2024 1:36 AM

## 2024-07-04 NOTE — PROGRESS NOTES
mild DWI signal in the left   posterior prostate. There is a subtle signal abnormality in this region on T2   images but no suspicious enhancement is identified. This is of intermediate   probability for malignancy. Follow-up exam could be performed in 6 months to   ensure stability      Colby catheter appears well-positioned in the bladder. Bladder wall thickening   and trabeculation likely secondary to chronic bladder outlet obstruction. Signal   abnormalities in the posterior bladder likely reflecting bladder debris or mild   hemorrhage.      Small volume ascites.            Electronically signed by Fernando Torreh      US RETROPERITONEAL COMPLETE   Final Result   Kidneys bilaterally show simple cyst and stones.   No hydronephrosis is seen on either side.   Increased echogenicity of the cortex is seen bilaterally suggesting medical   renal disease                  Electronically signed by Stan Herron      CT CHEST ABDOMEN PELVIS WO CONTRAST Additional Contrast? Oral   Final Result   No focal lung lesion is seen.   Kidneys bilaterally show nonobstructing stones.   Kidneys bilaterally show few cortical low densities, likely representing cyst   and can be further confirmed by ultrasound.   Prostamegaly. Please correlate with PSA.   No suspicious bone lesion. Compression L1 of indeterminate age.   No bowel obstruction/constipation.                  Electronically signed by Stan Herron        [email or web address]    LAB  Recent Labs     07/02/24 0528 07/02/24  1837 07/03/24  0119 07/03/24  1631 07/03/24  2121 07/04/24  0508   WBC  --  12.7* 12.4*  --   --  10.5   HGB 7.9* 8.7* 7.7* 7.2* 7.1* 7.5*   HCT 24.4* 26.4* 23.5* 22.4* 21.9* 22.9*    176 168  --   --  189   INR  --  1.1  --   --   --   --        Recent Labs     07/02/24 0528 07/03/24  0119 07/04/24  0508    138 137   K 3.7 3.7 4.3    105 104   CO2 26 25 24   BUN 59* 56* 44*   CREATININE 2.10* 1.87* 1.80*   PHOS 1.9*  --  1.7*           No results  for input(s): \"PH\", \"PCO2\", \"PO2\", \"HCO3\" in the last 72 hours.            Plan:  (Medical Decision Making)     1.  CRISTIN with urinary retention  No baseline creatinine available  Ultrasound of the kidney shows no hydronephrosis,  Improving with hydration and nowak placement   Back on IVF due to hypotension      Elevated PSA, BPH   Urology      Anemia    Hypokalemia  Better     Hypophosphatemia   Hypocalcemia vs ok  ( Low Alb)  Phos replacement   Vit D 25 ok

## 2024-07-05 PROBLEM — N18.32 STAGE 3B CHRONIC KIDNEY DISEASE (HCC): Status: ACTIVE | Noted: 2024-07-05

## 2024-07-05 PROBLEM — N17.9 ACUTE RENAL FAILURE DUE TO URINARY OBSTRUCTION (HCC): Status: ACTIVE | Noted: 2024-07-05

## 2024-07-05 PROBLEM — R00.0 TACHYCARDIA: Status: RESOLVED | Noted: 2024-07-02 | Resolved: 2024-07-05

## 2024-07-05 PROBLEM — R31.9 HEMATURIA: Status: RESOLVED | Noted: 2024-06-28 | Resolved: 2024-07-05

## 2024-07-05 PROBLEM — N40.1 ENLARGED PROSTATE WITH URINARY RETENTION: Status: ACTIVE | Noted: 2024-06-27

## 2024-07-05 PROBLEM — N13.9 ACUTE RENAL FAILURE DUE TO URINARY OBSTRUCTION (HCC): Status: ACTIVE | Noted: 2024-07-05

## 2024-07-05 PROBLEM — N17.0 ACUTE RENAL FAILURE WITH TUBULAR NECROSIS (HCC): Status: ACTIVE | Noted: 2024-06-27

## 2024-07-05 PROBLEM — R33.8 ENLARGED PROSTATE WITH URINARY RETENTION: Status: ACTIVE | Noted: 2024-06-27

## 2024-07-05 LAB
ANION GAP SERPL CALC-SCNC: 8 MMOL/L (ref 9–18)
BASOPHILS # BLD: 0 K/UL (ref 0–0.2)
BASOPHILS NFR BLD: 0 % (ref 0–2)
BUN SERPL-MCNC: 41 MG/DL (ref 8–23)
CALCIUM SERPL-MCNC: 7.6 MG/DL (ref 8.8–10.2)
CHLORIDE SERPL-SCNC: 102 MMOL/L (ref 98–107)
CO2 SERPL-SCNC: 25 MMOL/L (ref 20–28)
CREAT SERPL-MCNC: 1.81 MG/DL (ref 0.8–1.3)
DIFFERENTIAL METHOD BLD: ABNORMAL
EOSINOPHIL # BLD: 0.2 K/UL (ref 0–0.8)
EOSINOPHIL NFR BLD: 2 % (ref 0.5–7.8)
ERYTHROCYTE [DISTWIDTH] IN BLOOD BY AUTOMATED COUNT: 13.7 % (ref 11.9–14.6)
GLUCOSE SERPL-MCNC: 114 MG/DL (ref 70–99)
HCT VFR BLD AUTO: 22.7 % (ref 41.1–50.3)
HCT VFR BLD AUTO: 25.3 % (ref 41.1–50.3)
HGB BLD-MCNC: 7.3 G/DL (ref 13.6–17.2)
HGB BLD-MCNC: 8.4 G/DL (ref 13.6–17.2)
HISTORY CHECK: NORMAL
IMM GRANULOCYTES # BLD AUTO: 0.1 K/UL (ref 0–0.5)
IMM GRANULOCYTES NFR BLD AUTO: 1 % (ref 0–5)
LYMPHOCYTES # BLD: 1 K/UL (ref 0.5–4.6)
LYMPHOCYTES NFR BLD: 11 % (ref 13–44)
MAGNESIUM SERPL-MCNC: 1.2 MG/DL (ref 1.8–2.4)
MCH RBC QN AUTO: 31.1 PG (ref 26.1–32.9)
MCHC RBC AUTO-ENTMCNC: 32.2 G/DL (ref 31.4–35)
MCV RBC AUTO: 96.6 FL (ref 82–102)
MONOCYTES # BLD: 0.8 K/UL (ref 0.1–1.3)
MONOCYTES NFR BLD: 9 % (ref 4–12)
NEUTS SEG # BLD: 6.8 K/UL (ref 1.7–8.2)
NEUTS SEG NFR BLD: 77 % (ref 43–78)
NRBC # BLD: 0 K/UL (ref 0–0.2)
PHOSPHATE SERPL-MCNC: 2.5 MG/DL (ref 2.5–4.5)
PLATELET # BLD AUTO: 207 K/UL (ref 150–450)
PLATELET COMMENT: ADEQUATE
PMV BLD AUTO: 9.3 FL (ref 9.4–12.3)
POTASSIUM SERPL-SCNC: 4.1 MMOL/L (ref 3.5–5.1)
RBC # BLD AUTO: 2.35 M/UL (ref 4.23–5.6)
RBC MORPH BLD: ABNORMAL
SODIUM SERPL-SCNC: 136 MMOL/L (ref 136–145)
WBC # BLD AUTO: 8.9 K/UL (ref 4.3–11.1)
WBC MORPH BLD: ABNORMAL

## 2024-07-05 PROCEDURE — 86850 RBC ANTIBODY SCREEN: CPT

## 2024-07-05 PROCEDURE — 97112 NEUROMUSCULAR REEDUCATION: CPT

## 2024-07-05 PROCEDURE — 84100 ASSAY OF PHOSPHORUS: CPT

## 2024-07-05 PROCEDURE — 86900 BLOOD TYPING SEROLOGIC ABO: CPT

## 2024-07-05 PROCEDURE — 6370000000 HC RX 637 (ALT 250 FOR IP): Performed by: NURSE PRACTITIONER

## 2024-07-05 PROCEDURE — 6360000002 HC RX W HCPCS: Performed by: INTERNAL MEDICINE

## 2024-07-05 PROCEDURE — 86923 COMPATIBILITY TEST ELECTRIC: CPT

## 2024-07-05 PROCEDURE — 1100000000 HC RM PRIVATE

## 2024-07-05 PROCEDURE — 2580000003 HC RX 258: Performed by: INTERNAL MEDICINE

## 2024-07-05 PROCEDURE — 6370000000 HC RX 637 (ALT 250 FOR IP): Performed by: INTERNAL MEDICINE

## 2024-07-05 PROCEDURE — 97535 SELF CARE MNGMENT TRAINING: CPT

## 2024-07-05 PROCEDURE — 30233N1 TRANSFUSION OF NONAUTOLOGOUS RED BLOOD CELLS INTO PERIPHERAL VEIN, PERCUTANEOUS APPROACH: ICD-10-PCS | Performed by: INTERNAL MEDICINE

## 2024-07-05 PROCEDURE — 36430 TRANSFUSION BLD/BLD COMPNT: CPT

## 2024-07-05 PROCEDURE — 83735 ASSAY OF MAGNESIUM: CPT

## 2024-07-05 PROCEDURE — 6370000000 HC RX 637 (ALT 250 FOR IP): Performed by: FAMILY MEDICINE

## 2024-07-05 PROCEDURE — 80048 BASIC METABOLIC PNL TOTAL CA: CPT

## 2024-07-05 PROCEDURE — P9016 RBC LEUKOCYTES REDUCED: HCPCS

## 2024-07-05 PROCEDURE — 86901 BLOOD TYPING SEROLOGIC RH(D): CPT

## 2024-07-05 PROCEDURE — 85018 HEMOGLOBIN: CPT

## 2024-07-05 PROCEDURE — 85014 HEMATOCRIT: CPT

## 2024-07-05 PROCEDURE — 85025 COMPLETE CBC W/AUTO DIFF WBC: CPT

## 2024-07-05 PROCEDURE — 36415 COLL VENOUS BLD VENIPUNCTURE: CPT

## 2024-07-05 RX ORDER — SODIUM CHLORIDE 9 MG/ML
INJECTION, SOLUTION INTRAVENOUS PRN
Status: DISCONTINUED | OUTPATIENT
Start: 2024-07-05 | End: 2024-07-05

## 2024-07-05 RX ORDER — PANTOPRAZOLE SODIUM 40 MG/1
40 TABLET, DELAYED RELEASE ORAL
Qty: 30 TABLET | Refills: 2 | Status: SHIPPED | OUTPATIENT
Start: 2024-07-06

## 2024-07-05 RX ORDER — FINASTERIDE 5 MG/1
5 TABLET, FILM COATED ORAL DAILY
Qty: 30 TABLET | Refills: 2
Start: 2024-07-06

## 2024-07-05 RX ORDER — POTASSIUM CHLORIDE 20 MEQ/1
40 TABLET, EXTENDED RELEASE ORAL PRN
Status: DISCONTINUED | OUTPATIENT
Start: 2024-07-05 | End: 2024-07-06 | Stop reason: HOSPADM

## 2024-07-05 RX ORDER — TAMSULOSIN HYDROCHLORIDE 0.4 MG/1
0.4 CAPSULE ORAL DAILY
Qty: 30 CAPSULE | Refills: 2 | Status: SHIPPED | OUTPATIENT
Start: 2024-07-06

## 2024-07-05 RX ORDER — POTASSIUM CHLORIDE 7.45 MG/ML
10 INJECTION INTRAVENOUS PRN
Status: DISCONTINUED | OUTPATIENT
Start: 2024-07-05 | End: 2024-07-06 | Stop reason: HOSPADM

## 2024-07-05 RX ORDER — MAGNESIUM SULFATE IN WATER 40 MG/ML
4000 INJECTION, SOLUTION INTRAVENOUS ONCE
Status: DISCONTINUED | OUTPATIENT
Start: 2024-07-05 | End: 2024-07-05

## 2024-07-05 RX ORDER — MAGNESIUM SULFATE IN WATER 40 MG/ML
2000 INJECTION, SOLUTION INTRAVENOUS PRN
Status: DISCONTINUED | OUTPATIENT
Start: 2024-07-05 | End: 2024-07-06 | Stop reason: HOSPADM

## 2024-07-05 RX ORDER — SODIUM CHLORIDE 9 MG/ML
INJECTION, SOLUTION INTRAVENOUS PRN
Status: DISCONTINUED | OUTPATIENT
Start: 2024-07-05 | End: 2024-07-06 | Stop reason: HOSPADM

## 2024-07-05 RX ADMIN — SODIUM CHLORIDE: 9 INJECTION, SOLUTION INTRAVENOUS at 11:57

## 2024-07-05 RX ADMIN — APIXABAN 5 MG: 5 TABLET, FILM COATED ORAL at 09:29

## 2024-07-05 RX ADMIN — MAGNESIUM SULFATE HEPTAHYDRATE 2000 MG: 40 INJECTION, SOLUTION INTRAVENOUS at 11:59

## 2024-07-05 RX ADMIN — SODIUM CHLORIDE, PRESERVATIVE FREE 20 ML: 5 INJECTION INTRAVENOUS at 09:31

## 2024-07-05 RX ADMIN — POTASSIUM & SODIUM PHOSPHATES POWDER PACK 280-160-250 MG 250 MG: 280-160-250 PACK at 09:35

## 2024-07-05 RX ADMIN — MAGNESIUM SULFATE HEPTAHYDRATE 2000 MG: 40 INJECTION, SOLUTION INTRAVENOUS at 13:56

## 2024-07-05 RX ADMIN — FINASTERIDE 5 MG: 5 TABLET, FILM COATED ORAL at 09:30

## 2024-07-05 RX ADMIN — ALUMINUM HYDROXIDE, MAGNESIUM HYDROXIDE, AND SIMETHICONE 30 ML: 1200; 120; 1200 SUSPENSION ORAL at 05:11

## 2024-07-05 RX ADMIN — TAMSULOSIN HYDROCHLORIDE 0.4 MG: 0.4 CAPSULE ORAL at 09:30

## 2024-07-05 RX ADMIN — PANTOPRAZOLE SODIUM 40 MG: 40 TABLET, DELAYED RELEASE ORAL at 05:30

## 2024-07-05 RX ADMIN — SODIUM CHLORIDE, PRESERVATIVE FREE 10 ML: 5 INJECTION INTRAVENOUS at 21:21

## 2024-07-05 RX ADMIN — APIXABAN 5 MG: 5 TABLET, FILM COATED ORAL at 21:21

## 2024-07-05 RX ADMIN — METOPROLOL TARTRATE 25 MG: 25 TABLET, FILM COATED ORAL at 09:30

## 2024-07-05 ASSESSMENT — PAIN DESCRIPTION - DESCRIPTORS: DESCRIPTORS: ACHING

## 2024-07-05 ASSESSMENT — PAIN SCALES - GENERAL
PAINLEVEL_OUTOF10: 8
PAINLEVEL_OUTOF10: 0

## 2024-07-05 ASSESSMENT — PAIN DESCRIPTION - LOCATION: LOCATION: ABDOMEN

## 2024-07-05 ASSESSMENT — PAIN - FUNCTIONAL ASSESSMENT: PAIN_FUNCTIONAL_ASSESSMENT: ACTIVITIES ARE NOT PREVENTED

## 2024-07-05 ASSESSMENT — PAIN DESCRIPTION - ORIENTATION: ORIENTATION: MID

## 2024-07-05 NOTE — DISCHARGE SUMMARY
Hospitalist Discharge Summary   Admit Date:  2024  6:52 PM   DC Note date: 2024  Name:  Carlos Cox   Age:  68 y.o.  Sex:  male  :  1955   MRN:  [id number]   [place]  [facility] [place]  PCP:  No primary care provider on file.    Presenting Complaint: Fall and Rectal Bleeding     Initial Admission Diagnosis: Melena [K92.1]  CRISTIN (acute kidney injury) (HCC) [N17.9]  Acute kidney injury (HCC) [N17.9]  Acute urinary retention [R33.8]     Problem List for this Hospitalization (present on admission):    Principal Problem:    Acute renal failure with tubular necrosis (HCC)  Active Problems:    Weight loss    Anemia    Enlarged prostate with urinary retention    Cachexia (HCC)    Normocytic anemia    Moderate protein-calorie malnutrition (HCC)    Tinea pedis    DVT (deep venous thrombosis) (HCC)    Acute renal failure due to urinary obstruction (HCC)  Resolved Problems:    Hematuria    Tachycardia      Hospital Course:  Carlos Cox is a 68 y.o. male with medical history of  none, living alone, who is evaluated with abdominal pain/ decreased urination for 2 weeks.  Notices appetite has been poor as well.  In the ER, Found with urine retention 4 L in bladder s/p nowak. Creatinine 6.52/ . Receiving IVF. Bicarb 16 , anion gap 21. Pt Not aware of any prostate issues  Had pain and hematuria after nowak placed. Had some dark stools, but taking peptobismol.  No blood in stool. Denies NSAID use .HGB 9.9. Albumin 2.6   He has no local family or friends/ his mother lives in [place] and would be his next of kin and first emergency contact. [place] [phone]. Can drive, Tried to get to the store and stocks up on food but is hard, and has lost weight.     Admitted with urinary retention.  hematuria after nowak placement.  Workup concerning for possible prostate cancer on MRI prostate, but no mets on panCT scan.  Urology consulted and will follow up in office.   Cr improved to stable baseline.   No active bleeding but is weak  4:40 AM   Result Value Ref Range    Sodium 135 (L) 136 - 145 mmol/L    Potassium 4.4 3.5 - 5.1 mmol/L    Chloride 100 98 - 107 mmol/L    CO2 27 20 - 28 mmol/L    Anion Gap 8 (L) 9 - 18 mmol/L    Glucose 117 (H) 70 - 99 mg/dL    BUN 50 (H) 8 - 23 MG/DL    Creatinine 1.72 (H) 0.80 - 1.30 MG/DL    Est, Glom Filt Rate 42 (L) >60 ml/min/1.73m2    Calcium 8.2 (L) 8.8 - 10.2 MG/DL   Magnesium    Collection Time: 07/06/24  4:40 AM   Result Value Ref Range    Magnesium 2.1 1.8 - 2.4 mg/dL       No results for input(s): \"COVID19\" in the last 72 hours.    Recent Vital Data:  Patient Vitals for the past 24 hrs:   Temp Pulse Resp BP SpO2   07/06/24 1137 97.5 °F (36.4 °C) 87 16 104/69 98 %   07/06/24 0912 -- (!) 105 -- -- --   07/06/24 0900 -- -- -- 101/65 --   07/06/24 0721 97.5 °F (36.4 °C) 93 16 96/63 96 %   07/06/24 0345 98.2 °F (36.8 °C) 99 17 110/70 97 %   07/05/24 2356 -- (!) 107 -- 104/60 97 %   07/05/24 2331 98.1 °F (36.7 °C) (!) 110 17 98/61 96 %   07/05/24 1937 97.7 °F (36.5 °C) (!) 104 18 107/62 98 %   07/05/24 1908 98.7 °F (37.1 °C) (!) 101 18 106/62 98 %   07/05/24 1807 -- 96 -- 113/84 --   07/05/24 1617 98.1 °F (36.7 °C) 95 18 (!) 90/56 98 %   07/05/24 1559 98.2 °F (36.8 °C) 93 18 (!) 93/54 97 %   07/05/24 1521 -- 93 -- (!) 91/58 --   07/05/24 1520 97.5 °F (36.4 °C) 93 18 (!) 86/56 97 %       Oxygen Therapy  SpO2: 98 %  O2 Device: None (Room air)    Estimated body mass index is 18.05 kg/m² as calculated from the following:    Height as of this encounter: 1.689 m (5' 6.5\").    Weight as of this encounter: 51.5 kg (113 lb 8.6 oz).    Intake/Output Summary (Last 24 hours) at 7/6/2024 1152  Last data filed at 7/6/2024 0852  Gross per 24 hour   Intake 936.16 ml   Output 3750 ml   Net -2813.84 ml         Physical Exam:    General:          Chronically ill-appearing.  Frail.  Cachectic.   Head:               Normocephalic, atraumatic  Eyes:               Sclerae appear normal.  Pupils equally round.  CV:

## 2024-07-05 NOTE — CONSENT
Informed Consent for Blood Component Transfusion Note    I have discussed with the patient the rationale for blood component transfusion; its benefits in treating or preventing fatigue, organ damage, or death; and its risk which includes mild transfusion reactions, rare risk of blood borne infection, or more serious but rare reactions. I have discussed the alternatives to transfusion, including the risk and consequences of not receiving transfusion. The patient had an opportunity to ask questions and had agreed to proceed with transfusion of blood components.    Electronically signed by Tan Brower MD on 7/5/24 at 10:59 AM EDT

## 2024-07-05 NOTE — PLAN OF CARE
Problem: Discharge Planning  Goal: Discharge to home or other facility with appropriate resources  Outcome: Progressing  Flowsheets (Taken 7/4/2024 2151)  Discharge to home or other facility with appropriate resources: Identify barriers to discharge with patient and caregiver     Problem: Pain  Goal: Verbalizes/displays adequate comfort level or baseline comfort level  Outcome: Progressing  Flowsheets (Taken 7/4/2024 2151)  Verbalizes/displays adequate comfort level or baseline comfort level: Encourage patient to monitor pain and request assistance     Problem: Skin/Tissue Integrity  Goal: Absence of new skin breakdown  Description: 1.  Monitor for areas of redness and/or skin breakdown  2.  Assess vascular access sites hourly  3.  Every 4-6 hours minimum:  Change oxygen saturation probe site  4.  Every 4-6 hours:  If on nasal continuous positive airway pressure, respiratory therapy assess nares and determine need for appliance change or resting period.  Outcome: Progressing     Problem: Safety - Adult  Goal: Free from fall injury  Outcome: Progressing  Flowsheets (Taken 7/4/2024 2151)  Free From Fall Injury: Instruct family/caregiver on patient safety

## 2024-07-05 NOTE — CARE COORDINATION
Patient with discharge orders for today. Patient discharging to  [facility]. [employer] to provide transport. Pick-up time: 1730. Report line information given to primary RN. Patient has met all treatment goals and milestones for discharge. Patient/family in agreement with discharge plan. CM following until patient is discharged.   ADDENDUM: Discharge cancelled. Patient to receive blood transfusion today.   07/05/24 1251   Services At/After Discharge   Transition of Care Consult (CM Consult) Discharge Planning   Services At/After Discharge Transport;Skilled Nursing Facility (SNF)    Resource Information Provided? No   Mode of Transport at Discharge BLS   Confirm Follow Up Transport Self   Condition of Participation: Discharge Planning   The Plan for Transition of Care is related to the following treatment goals: Return to baseline   The Patient and/or Patient Representative was provided with a Choice of Provider? Patient   The Patient and/Or Patient Representative agree with the Discharge Plan? Yes   Freedom of Choice list was provided with basic dialogue that supports the patient's individualized plan of care/goals, treatment preferences, and shares the quality data associated with the providers?  Yes

## 2024-07-05 NOTE — PROGRESS NOTES
ACUTE OCCUPATIONAL THERAPY GOALS:   (Developed with and agreed upon by patient and/or caregiver.)  1. Pt will toilet with CGA   2. Pt will complete functional mobility for ADLs with CGA using AD as needed  3. Pt will complete lower body bathing and dressing with CGA using AE as needed  4. Pt will complete grooming and hygiene at sink with CGA  5. Pt will demonstrate independence with HEP to promote increased BUE strength and functional use for ADLs  6. Pt will tolerate 23 minutes functional activity with min or fewer rest breaks to promote increased endurance for ADLs  7. Pt will complete UB bathing and dressing with set up GOAL MET 7/5/24     Timeframe: 7 visits    OCCUPATIONAL THERAPY: Daily Note PM   OT Visit Days: 3   Time In/Out  OT Charge Capture  Rehab Caseload Tracker  OT Orders    Carlos Cox is a 68 y.o. male   PRIMARY DIAGNOSIS: CRISTIN (acute kidney injury) (HCC)  Melena [K92.1]  CRISTIN (acute kidney injury) (HCC) [N17.9]  Acute kidney injury (HCC) [N17.9]  Acute urinary retention [R33.8]       Inpatient: Payor: MEDICARE / Plan: MEDICARE PART A / Product Type: *No Product type* /     ASSESSMENT:     REHAB RECOMMENDATIONS:   Recommendation to date pending progress:  Setting:  Short-term Rehab    Equipment:    To Be Determined     ASSESSMENT:  Mr. Cox remains limited by generalized weakness and deficits in activity tolerance, mobility, and balance impacting ADLs. Pt required CGA for ADLs and for functional mobility using RW. Pt tolerated well, however slightly unsteady at times. Pt w/ decreased attention and safety awareness and required extra time and occasional cues for tasks. Pt is progressing towards goals, continue POC.        SUBJECTIVE:     Mr. Cox states, \"I'm doing much better today.\"     Social/Functional Lives With: Alone  Type of Home: Apartment  Home Layout: One level  Home Access: Stairs to enter without rails  Entrance Stairs - Number of Steps: 1  Home Equipment: None  Ambulation

## 2024-07-05 NOTE — PROGRESS NOTES
END OF SHIFT NOTE:    INTAKE/OUTPUT  07/04 0701 - 07/05 0700  In: 1575.6 [P.O.:760; I.V.:315.3]  Out: 1600 [Urine:1600]  Voiding: No  Catheter: Yes  Drain:   Urinary Catheter 06/27/24 Coude;2 Way (Active)   $ Urethral catheter insertion $ Not inserted for procedure 06/29/24 2208   Catheter Indications Urinary retention (acute or chronic), continuous bladder irrigation or bladder outlet obstruction 07/05/24 0812   Site Assessment No urethral drainage 07/05/24 0812   Urine Color Yellow 07/05/24 0812   Urine Appearance Clear 07/05/24 0812   Urine Odor Fruity 07/03/24 1936   Collection Container Standard 07/05/24 0812   Securement Method Securing device (Describe) 07/05/24 0812   Catheter Care  Perineal wipes 07/04/24 0954   Catheter Best Practices  Catheter secured to thigh;Bag below bladder;Bag not on floor;Lack of dependent loop in tubing;Drainage bag less than half full 07/05/24 0812   Status Draining 07/05/24 0812   Output (mL) 500 mL 07/05/24 1751   Discontinuation Reason Per provider order 07/01/24 1920       Flatus: Patient does have flatus present.    Stool: 0 occurrences.    Characteristics:      Emesis: 0 occurrences.    Characteristics:        VITAL SIGNS  Patient Vitals for the past 12 hrs:   Temp Pulse Resp BP SpO2   07/05/24 1937 97.7 °F (36.5 °C) (!) 104 18 107/62 98 %   07/05/24 1908 98.7 °F (37.1 °C) (!) 101 18 106/62 98 %   07/05/24 1807 -- 96 -- 113/84 --   07/05/24 1617 98.1 °F (36.7 °C) 95 18 (!) 90/56 98 %   07/05/24 1559 98.2 °F (36.8 °C) 93 18 (!) 93/54 97 %   07/05/24 1521 -- 93 -- (!) 91/58 --   07/05/24 1520 97.5 °F (36.4 °C) 93 18 (!) 86/56 97 %   07/05/24 1126 99.3 °F (37.4 °C) 98 18 96/63 100 %       Pain Assessment  Pain Level: 8 (07/05/24 0511)  Pain Location: Abdomen  Patient's Stated Pain Goal: 0 - No pain    Ambulating  Yes from bed to chair w/ one person assitance    Shift report given to oncoming nurse at the bedside.    Traci Villegas RN

## 2024-07-05 NOTE — PROGRESS NOTES
Patient w/ low phosphorus yesterday (1.7). Patient given PO replacement. Patient with abnormal Mag (1.3) on 6/30 and was given PO replacement. Magnesium  has not been rechecked since then. Hospitalist notified. New orders received for labs.

## 2024-07-05 NOTE — PROGRESS NOTES
END OF SHIFT NOTE:    INTAKE/OUTPUT  07/04 0701 - 07/05 0700  In: 1575.6 [P.O.:760; I.V.:315.3]  Out: 1600 [Urine:1600]  Voiding: Yes  Catheter: Yes  Drain:   Urinary Catheter 06/27/24 Coude;2 [address] (Active)   $ Urethral catheter insertion $ Not inserted for procedure 06/29/24 2208   Catheter Indications Urinary retention (acute or chronic), continuous bladder irrigation or bladder outlet obstruction 07/04/24 2151   Site Assessment No urethral drainage 07/04/24 2151   Urine Color Yellow 07/04/24 2151   Urine Appearance Clear 07/04/24 2151   Urine Odor Fruity 07/03/24 1936   Collection Container Standard 07/04/24 2151   Securement Method Securing device (Describe) 07/04/24 2151   Catheter Care  Perineal wipes 07/04/24 0954   Catheter Best Practices  Drainage tube clipped to bed;Catheter secured to thigh;Tamper seal intact;Bag below bladder;Bag not on floor;Lack of dependent loop in tubing;Drainage bag less than half full 07/04/24 2151   Status Draining 07/04/24 2151   Output (mL) 300 mL 07/05/24 0307   Discontinuation Reason Per provider order 07/01/24 1920               Flatus: Patient does have flatus present.    Stool: 2 occurrences.    Characteristics:  Stool Appearance: Loose  Stool Color: Brown  Stool Amount: Large  Stool Assessment  Incontinence: Yes  Stool Appearance: Loose  Stool Color: Brown  Stool Amount: Large  Stool Source: Rectum  Last BM (including prior to admit): 07/04/24 (per pt)    Emesis: 0 occurrences.    Characteristics:        VITAL SIGNS  Patient Vitals for the past 12 hrs:   Temp Pulse Resp BP SpO2   07/05/24 0506 -- 99 -- 96/61 --   07/05/24 0307 98.8 °F (37.1 °C) 100 18 104/68 96 %   07/04/24 2253 99.1 °F (37.3 °C) (!) 108 17 114/69 96 %   07/04/24 2157 -- (!) 110 -- 112/72 --   07/04/24 2151 -- -- -- -- 96 %   07/04/24 1911 99.1 °F (37.3 °C) (!) 112 16 129/68 96 %       Pain Assessment  Pain Level: 8 (07/05/24 0511)  Pain Location: Abdomen  Patient's Stated Pain Goal:  1    Ambulating  No    Shift report given to oncoming nurse at the bedside.    DEL ANTHONY RN

## 2024-07-05 NOTE — PROGRESS NOTES
Carlos Cox  Admission Date: 6/27/2024         [facility] Progress Note: 7/5/2024    Follow-up for: CRISTIN    The patient's chart is reviewed and the patient is discussed with the staff.    Subjective:     Patient seen and examined in room.    ROS:  Gen - no fever, no chills, appetite unchanged  CV - no chest pain, no palpitation  Lung - no shortness of breath, no cough  Abd - no tenderness, no nausea/vomiting, no diarrhea  Ext - no edema    Current Facility-Administered Medications   Medication Dose Route Frequency    potassium & sodium phosphates (PHOS-NAK) 280-160-250 MG packet 250 mg  1 packet Oral TID    apixaban (ELIQUIS) tablet 5 mg  5 mg Oral BID    metoprolol tartrate (LOPRESSOR) tablet 25 mg  25 mg Oral BID    loperamide (IMODIUM) capsule 4 mg  4 mg Oral 4x Daily PRN    aluminum & magnesium hydroxide-simethicone (MAALOX) 200-200-20 MG/5ML suspension 30 mL  30 mL Oral Q6H PRN    pantoprazole (PROTONIX) tablet 40 mg  40 mg Oral QAM AC    finasteride (PROSCAR) tablet 5 mg  5 mg Oral Daily    HYDROcodone-acetaminophen 2.5-108 mg/5 mL solution 15 mL  15 mL Oral Q4H PRN    diatrizoate meglumine-sodium (GASTROGRAFIN) 66-10 % solution 15 mL  15 mL Oral ONCE PRN    sodium chloride flush 0.9 % injection 5-40 mL  5-40 mL IntraVENous 2 times per day    sodium chloride flush 0.9 % injection 5-40 mL  5-40 mL IntraVENous PRN    0.9 % sodium chloride infusion   IntraVENous PRN    ondansetron (ZOFRAN-ODT) disintegrating tablet 4 mg  4 mg Oral Q8H PRN    Or    ondansetron (ZOFRAN) injection 4 mg  4 mg IntraVENous Q6H PRN    polyethylene glycol (GLYCOLAX) packet 17 g  17 g Oral Daily PRN    acetaminophen (TYLENOL) tablet 650 mg  650 mg Oral Q6H PRN    Or    acetaminophen (TYLENOL) suppository 650 mg  650 mg Rectal Q6H PRN    hyoscyamine (LEVSIN/SL) sublingual tablet 125 mcg  125 mcg SubLINGual Q4H PRN    tamsulosin (FLOMAX) capsule 0.4 mg  0.4 mg Oral Daily         Objective:     Vitals:     07/05/24 0307 07/05/24 0506 07/05/24 0553 07/05/24 0732   BP: 104/68 96/61  105/63   Pulse: 100 99  (!) 101   Resp: 18   18   Temp: 98.8 °F (37.1 °C)   98.6 °F (37 °C)   TempSrc: Oral   Oral   SpO2: 96%   98%   Weight:   50.9 kg (112 lb 3.4 oz)    Height:         Intake and Output:   07/03 1901 - 07/05 0700  In: 4272.3 [P.O.:760; I.V.:3012]  Out: 2450 [Urine:2450]  07/05 0701 - 07/05 1900  In: -   Out: 400 [Urine:400]    Physical Exam:   Constitutional:  in no acute distress  HEENT:  Sclera clear, pupils equal, oral mucosa moist  Lungs: Clear  Cardiovascular:  RRR without M,G,R  Abd/GI: soft and non-tender; with positive bowel sounds.  Ext: warm without cyanosis.   Chronic skin changes     Vascular duplex lower extremity venous bilateral   Final Result   1.  Likely acute on chronic deep venous thrombosis in the right lower extremity.   2.  Deep venous thrombosis in the left lower extremity.      Findings conveyed to Dr. Abiodun Parmar on 7/2/24 on 2:19 PM PST.      Electronically signed by Colby Mckeon      CT CHEST PULMONARY EMBOLISM W CONTRAST   Final Result   No evidence of pulmonary embolism.      Trace left pleural fluid         Electronically signed by NOREEN EASON      MRI LUMBAR SPINE W WO CONTRAST   Final Result      1.  Old compression fractures at T11 and L1 without significant retropulsion. No   acute compression fracture within the lumbar spine.      2.  Multilevel degenerative disc changes and spondylosis of the lumbar spine   with multilevel foraminal stenosis.      3.  Posterior paraspinous soft tissue edema as well as atrophy.      4.  No evidence of metastatic disease to the lumbar spine. No pathologic   compression fracture.      Electronically signed by RONAL BUSTILLOS      MRI PROSTATE W WO CONTRAST   Final Result   PI-RADS 3   Severe prostatomegaly and BPH. There is a focus of mild DWI signal in the left   posterior prostate. There is a subtle signal abnormality in this region on T2   images but

## 2024-07-05 NOTE — PROGRESS NOTES
Hospitalist Progress Note   Admit Date:  2024  6:52 PM   Name:  Carlos Cox   Age:  68 y.o.  Sex:  male  :  1955   MRN:  [id number]   Room:  216/01    Presenting/Chief Complaint: Fall and Rectal Bleeding     Reason(s) for Admission: Melena [K92.1]  CRISTIN (acute kidney injury) (HCC) [N17.9]  Acute kidney injury (HCC) [N17.9]  Acute urinary retention [R33.8]     Hospital Course:     Carlos Cox is a 68 y.o. male with medical history of  none, living alone, who is evaluated with abdominal pain/ decreased urination for 2 weeks.  Notices appetite has been poor as well.  In the ER, Found with urine retention 4 L in bladder s/p nowak. Creatinine 6.52/ . Receiving IVF. Bicarb 16 , anion gap 21. Pt Not aware of any prostate issues  Had pain after nowak placed. Had some dark stools, but taking peptobismol. Denies NSAID use .HGB 9.9. Albumin 2.6   He has no local family or friends/ his mother lives in [place] and would be his next of kin and first emergency contact. [place] [phone]. Can drive, Tried to get to the store and stocks up on food but is hard, and has lost weight.    Admitted with anemia.  Noted to have hematuria after nowak placement.  Workup concerning for possible prostate cancer on MRI prostate, but no mets on panCT scan.  Urology consulted and will follow up in office.   Cr improved to stable baseline.       Subjective & 24hr Events:   Working with therapy but is weak.  No bleeding noted.  Some diarrhea yesterday but no abd pain, fevers, or other complaints.  WBC remains wnl.    Awaiting SNF approval    Assessment & Plan:     Acute Kidney Injury   stable now.   Maintain nowak per urology  Daily BMP    Normocytic anemia  No active bleeding but is weak and symptomatic and hb borderline.  Might bleed again in future so will give 1u RBC with goal of improving symptoms and reducing readmission risk.    DVT  eliquis per IR.  No intervention  If any rebleeding, would stop AC and due

## 2024-07-06 VITALS
OXYGEN SATURATION: 97 % | DIASTOLIC BLOOD PRESSURE: 65 MMHG | WEIGHT: 113.54 LBS | TEMPERATURE: 98.2 F | HEIGHT: 67 IN | SYSTOLIC BLOOD PRESSURE: 100 MMHG | BODY MASS INDEX: 17.82 KG/M2 | HEART RATE: 92 BPM | RESPIRATION RATE: 18 BRPM

## 2024-07-06 LAB
ABO + RH BLD: NORMAL
ANION GAP SERPL CALC-SCNC: 8 MMOL/L (ref 9–18)
BLD PROD TYP BPU: NORMAL
BLOOD BANK BLOOD PRODUCT EXPIRATION DATE: NORMAL
BLOOD BANK CMNT PATIENT-IMP: NORMAL
BLOOD BANK DISPENSE STATUS: NORMAL
BLOOD BANK ISBT PRODUCT BLOOD TYPE: 9500
BLOOD BANK PRODUCT CODE: NORMAL
BLOOD BANK UNIT TYPE AND RH: NORMAL
BLOOD GROUP ANTIBODIES SERPL: NORMAL
BPU ID: NORMAL
BUN SERPL-MCNC: 50 MG/DL (ref 8–23)
CALCIUM SERPL-MCNC: 8.2 MG/DL (ref 8.8–10.2)
CHLORIDE SERPL-SCNC: 100 MMOL/L (ref 98–107)
CO2 SERPL-SCNC: 27 MMOL/L (ref 20–28)
CREAT SERPL-MCNC: 1.72 MG/DL (ref 0.8–1.3)
CROSSMATCH RESULT: NORMAL
GLUCOSE SERPL-MCNC: 117 MG/DL (ref 70–99)
MAGNESIUM SERPL-MCNC: 2.1 MG/DL (ref 1.8–2.4)
POTASSIUM SERPL-SCNC: 4.4 MMOL/L (ref 3.5–5.1)
SODIUM SERPL-SCNC: 135 MMOL/L (ref 136–145)
SPECIMEN EXP DATE BLD: NORMAL
UNIT DIVISION: 0
UNIT ISSUE DATE/TIME: NORMAL

## 2024-07-06 PROCEDURE — 6370000000 HC RX 637 (ALT 250 FOR IP): Performed by: FAMILY MEDICINE

## 2024-07-06 PROCEDURE — 2580000003 HC RX 258: Performed by: INTERNAL MEDICINE

## 2024-07-06 PROCEDURE — 36415 COLL VENOUS BLD VENIPUNCTURE: CPT

## 2024-07-06 PROCEDURE — 80048 BASIC METABOLIC PNL TOTAL CA: CPT

## 2024-07-06 PROCEDURE — 83735 ASSAY OF MAGNESIUM: CPT

## 2024-07-06 PROCEDURE — 6370000000 HC RX 637 (ALT 250 FOR IP): Performed by: INTERNAL MEDICINE

## 2024-07-06 PROCEDURE — 6370000000 HC RX 637 (ALT 250 FOR IP): Performed by: NURSE PRACTITIONER

## 2024-07-06 RX ORDER — MIDODRINE HYDROCHLORIDE 5 MG/1
5 TABLET ORAL
Status: DISCONTINUED | OUTPATIENT
Start: 2024-07-06 | End: 2024-07-06 | Stop reason: HOSPADM

## 2024-07-06 RX ORDER — MIDODRINE HYDROCHLORIDE 5 MG/1
5 TABLET ORAL 3 TIMES DAILY
Qty: 90 TABLET | Refills: 3
Start: 2024-07-06

## 2024-07-06 RX ADMIN — MIDODRINE HYDROCHLORIDE 5 MG: 5 TABLET ORAL at 16:31

## 2024-07-06 RX ADMIN — APIXABAN 5 MG: 5 TABLET, FILM COATED ORAL at 09:12

## 2024-07-06 RX ADMIN — HYDROCODONE BITARTRATE AND ACETAMINOPHEN 15 ML: 7.5; 325 SOLUTION ORAL at 15:16

## 2024-07-06 RX ADMIN — FINASTERIDE 5 MG: 5 TABLET, FILM COATED ORAL at 09:12

## 2024-07-06 RX ADMIN — MIDODRINE HYDROCHLORIDE 5 MG: 5 TABLET ORAL at 12:33

## 2024-07-06 RX ADMIN — METOPROLOL TARTRATE 25 MG: 25 TABLET, FILM COATED ORAL at 09:12

## 2024-07-06 RX ADMIN — SODIUM CHLORIDE, PRESERVATIVE FREE 10 ML: 5 INJECTION INTRAVENOUS at 10:44

## 2024-07-06 RX ADMIN — TAMSULOSIN HYDROCHLORIDE 0.4 MG: 0.4 CAPSULE ORAL at 09:12

## 2024-07-06 RX ADMIN — PANTOPRAZOLE SODIUM 40 MG: 40 TABLET, DELAYED RELEASE ORAL at 05:23

## 2024-07-06 NOTE — PROGRESS NOTES
TRANSFER - OUT REPORT:    Verbal report given to SARAH Carter on Carlos Cox  being transferred to [facility] for routine progression of patient care       Report consisted of patient's Situation, Background, Assessment and   Recommendations(SBAR).     Information from the following report(s) Nurse Handoff Report, Index, Adult Overview, Surgery Report, Intake/Output, MAR, Recent Results, Neuro Assessment, and Event Log was reviewed with the receiving nurse.           Lines:       Opportunity for questions and clarification was provided.      Patient transported with:  Tech     REPORT GIVEN @ 3229

## 2024-07-06 NOTE — CARE COORDINATION
Pt is for discharge today to [facility], Room # 122 as planned. Report is [phone]. Transport via [employer] around 2:30 pm.  Packet prepared to go with pt to facility.  MSW spoke with pt at bedside with update on anticipated transport time.         06/28/24 2710   Service Assessment   Patient Orientation Alert and Oriented   Cognition Alert   History Provided By Patient   Primary Caregiver Self   Accompanied By/Relationship n/a   Support Systems Friends/Neighbors   Patient's Healthcare Decision Maker is: Legal Next of Kin   PCP Verified by CM No  (Refuses referral)   Prior Functional Level Independent in ADLs/IADLs   Current Functional Level Assistance with the following:;Bathing;Dressing;Mobility   Can patient return to prior living arrangement Unknown at present   Ability to make needs known: Good   Family able to assist with home care needs: No   Would you like for me to discuss the discharge plan with any other family members/significant others, and if so, who? No   Financial Resources Medicare  (Part A only)   Community Resources None   Social/Functional History   Lives With Alone   Type of Home Apartment   Home Layout One level   Home Access Level entry   Home Equipment None   Receives Help From Friend(s)   ADL Assistance Independent   Ambulation Assistance Independent   Transfer Assistance Independent   Active  Yes   Occupation Retired   Discharge Planning   Type of Residence Apartment   Living Arrangements Alone   Current Services Prior To Admission None   DME Ordered? No   Potential Assistance Purchasing Medications No   Type of Home Care Services None   Patient expects to be discharged to: Apartment   History of falls? 1   Services At/After Discharge   Transition of Care Consult (CM Consult) Discharge Planning;SNF   Partner SNF Yes   Services At/After Discharge Skilled Nursing Facility (SNF)  ([facility])   [place] Resource Information Provided? No   Mode of Transport at  Discharge BLS  ([employer])   Hospital Transport Time of Discharge 0230   Confirm Follow Up Transport Family   Condition of Participation: Discharge Planning   The Plan for Transition of Care is related to the following treatment goals: Pt will discharge to [facility]   The Patient and/or Patient Representative was provided with a Choice of Provider? Patient   The Patient and/Or Patient Representative agree with the Discharge Plan? Yes   Freedom of Choice list was provided with basic dialogue that supports the patient's individualized plan of care/goals, treatment preferences, and shares the quality data associated with the providers?  Yes

## 2024-07-06 NOTE — PROGRESS NOTES
Carlos Cox  Admission Date: 6/27/2024         [facility] Progress Note: 7/6/2024    Follow-up for: CRISTIN    The patient's chart is reviewed and the patient is discussed with the staff.    Subjective:     Patient seen and examined in room.    ROS:  Gen - no fever, no chills, appetite unchanged  CV - no chest pain, no palpitation  Lung - no shortness of breath, no cough  Abd - no tenderness, no nausea/vomiting, no diarrhea  Ext - no edema    Current Facility-Administered Medications   Medication Dose Route Frequency    midodrine (PROAMATINE) tablet 5 mg  5 mg Oral TID WC    potassium chloride (KLOR-CON M) extended release tablet 40 mEq  40 mEq Oral PRN    Or    potassium bicarb-citric acid (EFFER-K) effervescent tablet 40 mEq  40 mEq Oral PRN    Or    potassium chloride 10 mEq/100 mL IVPB (Peripheral Line)  10 mEq IntraVENous PRN    magnesium sulfate 2000 mg in 50 mL IVPB premix  2,000 mg IntraVENous PRN    0.9 % sodium chloride infusion   IntraVENous PRN    apixaban (ELIQUIS) tablet 5 mg  5 mg Oral BID    metoprolol tartrate (LOPRESSOR) tablet 25 mg  25 mg Oral BID    loperamide (IMODIUM) capsule 4 mg  4 mg Oral 4x Daily PRN    aluminum & magnesium hydroxide-simethicone (MAALOX) 200-200-20 MG/5ML suspension 30 mL  30 mL Oral Q6H PRN    pantoprazole (PROTONIX) tablet 40 mg  40 mg Oral QAM AC    finasteride (PROSCAR) tablet 5 mg  5 mg Oral Daily    HYDROcodone-acetaminophen 2.5-108 mg/5 mL solution 15 mL  15 mL Oral Q4H PRN    diatrizoate meglumine-sodium (GASTROGRAFIN) 66-10 % solution 15 mL  15 mL Oral ONCE PRN    sodium chloride flush 0.9 % injection 5-40 mL  5-40 mL IntraVENous 2 times per day    sodium chloride flush 0.9 % injection 5-40 mL  5-40 mL IntraVENous PRN    0.9 % sodium chloride infusion   IntraVENous PRN    ondansetron (ZOFRAN-ODT) disintegrating tablet 4 mg  4 mg Oral Q8H PRN    Or    ondansetron (ZOFRAN) injection 4 mg  4 mg IntraVENous Q6H PRN    polyethylene glycol    CO2 24 25 27   BUN 44* 41* 50*   CREATININE 1.80* 1.81* 1.72*   MG  --  1.2* 2.1   PHOS 1.7* 2.5  --            No results for input(s): \"PH\", \"PCO2\", \"PO2\", \"HCO3\" in the last 72 hours.            Plan:  (Medical Decision Making)     CRISTIN with urinary retention  No baseline creatinine available  Ultrasound of the kidney shows no hydronephrosis,  Improving with hydration and nowak placement   F/u      Elevated PSA, BPH   Urology      Anemia    Hypokalemia  Better     Hypophosphatemia   Hypocalcemia   Phos replacement   Better     Hypomagnesemia  Supplement   Better

## 2024-07-06 NOTE — PROGRESS NOTES
END OF SHIFT NOTE:    INTAKE/OUTPUT  07/05 0701 - 07/06 0700  In: 1356.2 [P.O.:770; I.V.:124.7]  Out: 3025 [Urine:3025]  Voiding: Yes  Catheter: Yes(nowak)  Drain:   Urinary Catheter 06/27/24 Coude;2 Way (Active)   $ Urethral catheter insertion $ Not inserted for procedure 06/29/24 2208   Catheter Indications Urinary retention (acute or chronic), continuous bladder irrigation or bladder outlet obstruction 07/05/24 2000   Site Assessment No urethral drainage 07/05/24 2000   Urine Color Yellow 07/05/24 2000   Urine Appearance Clear 07/05/24 2000   Urine Odor Fruity 07/03/24 1936   Collection Container Standard 07/05/24 2000   Securement Method Securing device (Describe) 07/05/24 2000   Catheter Care  Perineal wipes 07/05/24 2000   Catheter Best Practices  Catheter secured to thigh;Bag below bladder;Bag not on floor;Lack of dependent loop in tubing;Drainage bag less than half full 07/05/24 2000   Status Draining 07/05/24 2000   Output (mL) 675 mL 07/06/24 0351   Discontinuation Reason Per provider order 07/01/24 1920               Flatus: Patient does have flatus present.    Stool:  occurrences.    Characteristics:  Stool Appearance: Loose  Stool Color: Brown  Stool Amount: Large  Stool Assessment  Incontinence: Yes  Stool Appearance: Loose  Stool Color: Brown  Stool Amount: Large  Stool Source: Rectum  Last BM (including prior to admit): 07/04/24    Emesis:  occurrences.    Characteristics:        VITAL SIGNS  Patient Vitals for the past 12 hrs:   Temp Pulse Resp BP SpO2   07/06/24 0345 98.2 °F (36.8 °C) 99 17 110/70 97 %   07/05/24 2356 -- (!) 107 -- 104/60 97 %   07/05/24 2331 98.1 °F (36.7 °C) (!) 110 17 98/61 96 %   07/05/24 1937 97.7 °F (36.5 °C) (!) 104 18 107/62 98 %   07/05/24 1908 98.7 °F (37.1 °C) (!) 101 18 106/62 98 %       Pain Assessment  Pain Level: 0 (07/05/24 2000)  Pain Location: Abdomen  Patient's Stated Pain Goal: 0 - No pain    Ambulating  No    Shift report given to oncoming nurse at the

## 2024-07-09 NOTE — TELEPHONE ENCOUNTER
CALLED PT AGAIN TO GET INSURANCE INFO, CALL GOES STRAIGHT TO VOICE MAIL, LEFT VOICE MAIL , WILL TRY AGAIN

## 2024-07-10 NOTE — TELEPHONE ENCOUNTER
HAVE CALLED PT MORE THE 5X WITH NO RESPONSE TO ANY VOICEMAILS, REACHED THE SISTER THROUGH THE PARENTS PHONE NUMBER WHO STATED THEY WOULD HAVE HIM CONTACT ME BACK, CAN'T GET PA ON CYST[employer]

## 2024-07-16 LAB
FUNGAL CULT/SMEAR: NORMAL
FUNGUS CULTURE: NORMAL
FUNGUS IDENTIFICATION: NORMAL
FUNGUS SMEAR: NORMAL
REFLEX TO ID: NORMAL
SPECIMEN PROCESSING: NORMAL
SPECIMEN SOURCE: NORMAL

## 2024-08-08 ENCOUNTER — OFFICE VISIT (OUTPATIENT)
Dept: UROLOGY | Age: 69
End: 2024-08-08

## 2024-08-08 ENCOUNTER — TELEPHONE (OUTPATIENT)
Dept: UROLOGY | Age: 69
End: 2024-08-08

## 2024-08-08 DIAGNOSIS — R97.20 ELEVATED PSA: ICD-10-CM

## 2024-08-08 DIAGNOSIS — N13.8 BPH WITH OBSTRUCTION/LOWER URINARY TRACT SYMPTOMS: ICD-10-CM

## 2024-08-08 DIAGNOSIS — N40.1 BPH WITH OBSTRUCTION/LOWER URINARY TRACT SYMPTOMS: ICD-10-CM

## 2024-08-08 DIAGNOSIS — R31.0 GROSS HEMATURIA: Primary | ICD-10-CM

## 2024-08-08 PROCEDURE — 1123F ACP DISCUSS/DSCN MKR DOCD: CPT | Performed by: NURSE PRACTITIONER

## 2024-08-08 PROCEDURE — 99214 OFFICE O/P EST MOD 30 MIN: CPT | Performed by: NURSE PRACTITIONER

## 2024-08-08 ASSESSMENT — ENCOUNTER SYMPTOMS
BLOOD IN STOOL: 0
WHEEZING: 0
BACK PAIN: 0
INDIGESTION: 0
VOMITING: 0
CONSTIPATION: 0
DIARRHEA: 0
EYE DISCHARGE: 0
SHORTNESS OF BREATH: 0
SKIN LESIONS: 0
NAUSEA: 0
EYE PAIN: 0
ABDOMINAL PAIN: 0
COUGH: 0
HEARTBURN: 0

## 2024-08-08 NOTE — PROGRESS NOTES
[facility]  [address] [address]  [phone]          Carlos Cox  : 1955    Chief Complaint   Patient presents with    New Patient    Hematuria          HPI     Carlos Cox is a 68 y.o. male admitted to [facility] in  for CRISTIN/Urinary retention. Creatinine 6.52/ . Nowak placed w 4L output. Seen by Dr. Patten. Kidney function corrected w nowak.   Lab Results   Component Value Date    PSA 27.6 (H) 2024   MRI prostate was done showing PI-RADS 3 left posterior prostate and severe BPH. Eliquis started for DVT. Hematuria while inpt. Flomax and finasteride started. He was scheduled for outpt cysto, but did not make appt.     Admitted to [facility] in  for gross hematuria. Negative urine culture. IVC filter placed 24. Eliquis was DC.     Here today for fu. Catheter is draining wo issue. Has seen very little blood in catheter since eliquis was DC. He is afebrile. Recent cath change at [facility].                History reviewed. No pertinent past medical history.  History reviewed. No pertinent surgical history.  Current Outpatient Medications   Medication Sig Dispense Refill    midodrine (PROAMATINE) 5 MG tablet Take 1 tablet by mouth 3 times daily 90 tablet 3    apixaban (ELIQUIS) 5 MG TABS tablet Take 1 tablet by mouth 2 times daily 60 tablet 2    finasteride (PROSCAR) 5 MG tablet Take 1 tablet by mouth daily 30 tablet 2    metoprolol tartrate (LOPRESSOR) 25 MG tablet Take 1 tablet by mouth 2 times daily 60 tablet 2    pantoprazole (PROTONIX) 40 MG tablet Take 1 tablet by mouth every morning (before breakfast) 30 tablet 2    tamsulosin (FLOMAX) 0.4 MG capsule Take 1 capsule by mouth daily 30 capsule 2     No current facility-administered medications for this visit.     No Known Allergies  Social History     Socioeconomic History    Marital status: Single     Spouse name: Not on file    Number of children: Not on file    Years of education:

## 2024-08-08 NOTE — TELEPHONE ENCOUNTER
Received a call from PCP on urgent line, patient recently hospitalized with hematuria.  Pt previously scheduled for new patient cystoscopy but was hospitalized and unable to make appointment.  Provider asking if we have any available appointments to have patient seen soon.   Scheduled new patient appt with SAMM Ocampo.